# Patient Record
Sex: FEMALE | Race: WHITE | NOT HISPANIC OR LATINO | Employment: OTHER | URBAN - METROPOLITAN AREA
[De-identification: names, ages, dates, MRNs, and addresses within clinical notes are randomized per-mention and may not be internally consistent; named-entity substitution may affect disease eponyms.]

---

## 2020-08-14 ENCOUNTER — TRANSFERRED RECORDS (OUTPATIENT)
Dept: MULTI SPECIALTY CLINIC | Facility: CLINIC | Age: 65
End: 2020-08-14

## 2021-08-01 ASSESSMENT — ENCOUNTER SYMPTOMS
DIARRHEA: 0
NERVOUS/ANXIOUS: 1
HEARTBURN: 0
SHORTNESS OF BREATH: 0
DIZZINESS: 0
CHILLS: 0
FREQUENCY: 0
JOINT SWELLING: 0
SORE THROAT: 0
FEVER: 0
PALPITATIONS: 0
COUGH: 0
HEADACHES: 0
ABDOMINAL PAIN: 0
BREAST MASS: 0
ARTHRALGIAS: 1
NAUSEA: 0
EYE PAIN: 0
MYALGIAS: 1
WEAKNESS: 0
CONSTIPATION: 0
DYSURIA: 0
HEMATURIA: 0
HEMATOCHEZIA: 0
PARESTHESIAS: 0

## 2021-08-01 ASSESSMENT — ACTIVITIES OF DAILY LIVING (ADL): CURRENT_FUNCTION: NO ASSISTANCE NEEDED

## 2021-08-03 ENCOUNTER — OFFICE VISIT (OUTPATIENT)
Dept: FAMILY MEDICINE | Facility: CLINIC | Age: 66
End: 2021-08-03
Payer: MEDICARE

## 2021-08-03 VITALS
SYSTOLIC BLOOD PRESSURE: 136 MMHG | WEIGHT: 201 LBS | TEMPERATURE: 97.9 F | DIASTOLIC BLOOD PRESSURE: 82 MMHG | BODY MASS INDEX: 33.49 KG/M2 | RESPIRATION RATE: 15 BRPM | HEART RATE: 70 BPM | OXYGEN SATURATION: 99 % | HEIGHT: 65 IN

## 2021-08-03 DIAGNOSIS — Z86.16 HISTORY OF COVID-19: ICD-10-CM

## 2021-08-03 DIAGNOSIS — Z00.00 ENCOUNTER FOR MEDICARE ANNUAL WELLNESS EXAM: Primary | ICD-10-CM

## 2021-08-03 DIAGNOSIS — G25.81 RESTLESS LEGS: ICD-10-CM

## 2021-08-03 DIAGNOSIS — E78.49 OTHER HYPERLIPIDEMIA: ICD-10-CM

## 2021-08-03 DIAGNOSIS — Z12.31 VISIT FOR SCREENING MAMMOGRAM: ICD-10-CM

## 2021-08-03 DIAGNOSIS — N95.2 ATROPHIC VAGINITIS: ICD-10-CM

## 2021-08-03 DIAGNOSIS — E66.811 OBESITY (BMI 30.0-34.9): ICD-10-CM

## 2021-08-03 DIAGNOSIS — Z78.0 ASYMPTOMATIC POSTMENOPAUSAL STATUS: ICD-10-CM

## 2021-08-03 DIAGNOSIS — Z87.891 HISTORY OF TOBACCO ABUSE: ICD-10-CM

## 2021-08-03 DIAGNOSIS — Z11.59 ENCOUNTER FOR HEPATITIS C SCREENING TEST FOR LOW RISK PATIENT: ICD-10-CM

## 2021-08-03 DIAGNOSIS — Z87.891 PERSONAL HISTORY OF TOBACCO USE: ICD-10-CM

## 2021-08-03 PROBLEM — J30.9 ALLERGIC RHINITIS: Status: ACTIVE | Noted: 2018-01-08

## 2021-08-03 PROBLEM — M85.80 OSTEOPENIA: Status: ACTIVE | Noted: 2017-01-04

## 2021-08-03 PROBLEM — E78.5 HYPERLIPIDEMIA: Status: ACTIVE | Noted: 2018-01-08

## 2021-08-03 PROBLEM — G47.00 INSOMNIA: Status: ACTIVE | Noted: 2018-01-08

## 2021-08-03 PROBLEM — M19.90 OSTEOARTHRITIS: Status: ACTIVE | Noted: 2021-08-03

## 2021-08-03 LAB
CHOLEST SERPL-MCNC: 158 MG/DL
FASTING STATUS PATIENT QL REPORTED: YES
FASTING STATUS PATIENT QL REPORTED: YES
GLUCOSE BLD-MCNC: 90 MG/DL (ref 70–99)
HDLC SERPL-MCNC: 55 MG/DL
LDLC SERPL CALC-MCNC: 79 MG/DL
NONHDLC SERPL-MCNC: 103 MG/DL
TRIGL SERPL-MCNC: 121 MG/DL

## 2021-08-03 PROCEDURE — 36415 COLL VENOUS BLD VENIPUNCTURE: CPT | Performed by: FAMILY MEDICINE

## 2021-08-03 PROCEDURE — 82947 ASSAY GLUCOSE BLOOD QUANT: CPT | Performed by: FAMILY MEDICINE

## 2021-08-03 PROCEDURE — 80061 LIPID PANEL: CPT | Performed by: FAMILY MEDICINE

## 2021-08-03 PROCEDURE — 86803 HEPATITIS C AB TEST: CPT | Performed by: FAMILY MEDICINE

## 2021-08-03 PROCEDURE — G0438 PPPS, INITIAL VISIT: HCPCS | Performed by: FAMILY MEDICINE

## 2021-08-03 PROCEDURE — G0296 VISIT TO DETERM LDCT ELIG: HCPCS | Performed by: FAMILY MEDICINE

## 2021-08-03 RX ORDER — CONJUGATED ESTROGENS 0.62 MG/G
0.5 CREAM VAGINAL
Qty: 30 G | Refills: 3 | Status: SHIPPED | OUTPATIENT
Start: 2021-08-05 | End: 2022-04-19

## 2021-08-03 RX ORDER — ATORVASTATIN CALCIUM 10 MG/1
10 TABLET, FILM COATED ORAL DAILY
Qty: 90 TABLET | Refills: 3 | Status: SHIPPED | OUTPATIENT
Start: 2021-08-03 | End: 2022-09-23

## 2021-08-03 RX ORDER — CONJUGATED ESTROGENS 0.62 MG/G
CREAM VAGINAL
COMMUNITY
Start: 2021-01-19 | End: 2021-08-03

## 2021-08-03 RX ORDER — ATORVASTATIN CALCIUM 10 MG/1
10 TABLET, FILM COATED ORAL DAILY
Qty: 90 TABLET | Refills: 3 | Status: SHIPPED | OUTPATIENT
Start: 2021-08-03 | End: 2021-08-03

## 2021-08-03 RX ORDER — ALPRAZOLAM 0.5 MG
TABLET ORAL
COMMUNITY
Start: 2020-07-31 | End: 2022-04-19

## 2021-08-03 RX ORDER — ATORVASTATIN CALCIUM 10 MG/1
TABLET, FILM COATED ORAL
COMMUNITY
Start: 2021-07-13 | End: 2021-08-03

## 2021-08-03 ASSESSMENT — ACTIVITIES OF DAILY LIVING (ADL): CURRENT_FUNCTION: NO ASSISTANCE NEEDED

## 2021-08-03 ASSESSMENT — MIFFLIN-ST. JEOR: SCORE: 1447.61

## 2021-08-03 NOTE — PATIENT INSTRUCTIONS
Patient Education   Personalized Prevention Plan  You are due for the preventive services outlined below.  Your care team is available to assist you in scheduling these services.  If you have already completed any of these items, please share that information with your care team to update in your medical record.  Health Maintenance Due   Topic Date Due     Osteoporosis Screening  Never done     ANNUAL REVIEW OF HM ORDERS  Never done     Mammogram  Never done     Colorectal Cancer Screening  Never done     Hepatitis C Screening  Never done     Cholesterol Lab  Never done     FALL RISK ASSESSMENT  Never done       Understanding USDA MyPlate  The USDA has guidelines to help you make healthy food choices. These are called MyPlate. MyPlate shows the food groups that make up healthy meals using the image of a place setting. Before you eat, think about the healthiest choices for what to put on your plate or in your cup or bowl. To learn more about building a healthy plate, visit www.choosemyplate.gov.    The food groups    Fruits. Any fruit or 100% fruit juice counts as part of the Fruit Group. Fruits may be fresh, canned, frozen, or dried, and may be whole, cut-up, or pureed. Make 1/2 of your plate fruits and vegetables.    Vegetables. Any vegetable or 100% vegetable juice counts as a member of the Vegetable Group. Vegetables may be fresh, frozen, canned, or dried. They can be served raw or cooked and may be whole, cut-up, or mashed. Make 1/2 of your plate fruits and vegetables.    Grains. All foods made from grains are part of the Grains Group. These include wheat, rice, oats, cornmeal, and barley. Grains are often used to make foods such as bread, pasta, oatmeal, cereal, tortillas, and grits. Grains should be no more than 1/4 of your plate. At least half of your grains should be whole grains.    Protein. This group includes meat, poultry, seafood, beans and peas, eggs, processed soy products (such as tofu), nuts  (including nut butters), and seeds. Make protein choices no more than 1/4 of your plate. Meat and poultry choices should be lean or low fat.    Dairy. The Dairy Group includes all fluid milk products and foods made from milk that contain calcium, such as yogurt and cheese. (Foods that have little calcium, such as cream, butter, and cream cheese, are not part of this group.) Most dairy choices should be low-fat or fat-free.    Oils. Oils aren't a food group, but they do contain essential nutrients. However it's important to watch your intake of oils. These are fats that are liquid at room temperature. They include canola, corn, olive, soybean, vegetable, and sunflower oil. Foods that are mainly oil include mayonnaise, certain salad dressings, and soft margarines. You likely already get your daily oil allowance from the foods you eat.  Things to limit  Eating healthy also means limiting these things in your diet:       Salt (sodium). Many processed foods have a lot of sodium. To keep sodium intake down, eat fresh vegetables, meats, poultry, and seafood when possible. Purchase low-sodium, reduced-sodium, or no-salt-added food products at the store. And don't add salt to your meals at home. Instead, season them with herbs and spices such as dill, oregano, cumin, and paprika. Or try adding flavor with lemon or lime zest and juice.    Saturated fat. Saturated fats are most often found in animal products such as beef, pork, and chicken. They are often solid at room temperature, such as butter. To reduce your saturated fat intake, choose leaner cuts of meat and poultry. And try healthier cooking methods such as grilling, broiling, roasting, or baking. For a simple lower-fat swap, use plain nonfat yogurt instead of mayonnaise when making potato salad or macaroni salad.    Added sugars. These are sugars added to foods. They are in foods such as ice cream, candy, soda, fruit drinks, sports drinks, energy drinks, cookies,  pastries, jams, and syrups. Cut down on added sugars by sharing sweet treats with a family member or friend. You can also choose fruit for dessert, and drink water or other unsweetened beverages.     [employer] last reviewed this educational content on 6/1/2020 2000-2021 [employer]. All rights reserved. This information is not intended as a substitute for professional medical care. Always follow your healthcare professional's instructions.          Signs of Hearing Loss      Hearing much better with one ear can be a sign of hearing loss.   Hearing loss is a problem shared by many people. In fact, it is one of the most common health problems, particularly as people age. Most people age 65 and older have some hearing loss. By age 80, almost everyone does. Hearing loss often occurs slowly over the years. So you may not realize your hearing has gotten worse.  Have your hearing checked  Call your healthcare provider if you:    Have to strain to hear normal conversation    Have to watch other people s faces very carefully to follow what they re saying    Need to ask people to repeat what they ve said    Often misunderstand what people are saying    Turn the volume of the television or radio up so high that others complain    Feel that people are mumbling when they re talking to you    Find that the effort to hear leaves you feeling tired and irritated    Notice, when using the phone, that you hear better with one ear than the other  [employer] last reviewed this educational content on 1/1/2020 2000-2021 [employer]. All rights reserved. This information is not intended as a substitute for professional medical care. Always follow your healthcare professional's instructions.           Lung Cancer Screening   Frequently Asked Questions  If you are at high-risk for lung cancer, getting screened with low-dose computed tomography (LDCT) every year can help save your life. This handout offers  answers to some of the most common questions about lung cancer screening. If you have other questions, please call [phone] ([phone]).     What is it?  Lung cancer screening uses special X-ray technology to create an image of your lung tissue. The exam is quick and easy and takes less than 10 seconds. We don t give you any medicine or use any needles. You can eat before and after the exam. You don t need to change your clothes as long as the clothing on your chest doesn t contain metal. But, you do need to be able to hold your breath for at least 6 seconds during the exam.    What is the goal of lung cancer screening?  The goal of lung cancer screening is to save lives. Many times, lung cancer is not found until a person starts having physical symptoms. Lung cancer screening can help detect lung cancer in the earliest stages when it may be easier to treat.    Who should be screened for lung cancer?  We suggest lung cancer screening for anyone who is at high-risk for lung cancer. You are in the high-risk group if you:      are between the ages of 55 and 79, and    have smoked at least 1 pack of cigarettes a day for 30 or more years, and    still smoke or have quit within the past 15 years.    However, if you have a new cough or shortness of breath, you should talk to your doctor before being screened.    Some national lung health advocacy groups also recommend screening for people ages 50 to 79 who have smoked an average of 1 pack of cigarettes a day for 20 years. They must also have at least 1 other risk factor for lung cancer, not including exposure to secondhand smoke. Other risk factors are having had cancer in the past, emphysema, pulmonary fibrosis, COPD, a family history of lung cancer, or exposure to certain materials such as arsenic, asbestos, beryllium, cadmium, chromium, diesel fumes, nickel, radon or silica. Your care team can help you know if you have one of these risk factors.     Why  does it matter if I have symptoms?  Certain symptoms can be a sign that you have a condition in your lungs that should be checked and treated by your doctor. These symptoms include fever, chest pain, a new or changing cough, shortness of breath that you have never felt before, coughing up blood or unexplained weight loss. Having any of these symptoms can greatly affect the results of lung cancer screening.       Should all smokers get an LDCT lung cancer screening exam?  It depends. Lung cancer screening is for a very specific group of men and women who have a history of heavy smoking over a long period of time (see  Who should be screened for lung cancer  above).  I am in the high-risk group, but have been diagnosed with cancer in the past. Is LDCT lung cancer screening right for me?  In some cases, you should not have LDCT lung screening, such as when your doctor is already following your cancer with CT scan studies. Your doctor will help you decide if LDCT lung screening is right for you.  Do I need to have a screening exam every year?  Yes. If you are in the high-risk group described earlier, you should get an LDCT lung cancer screening exam every year until you are 79, or are no longer willing or able to undergo screening and possible procedures to diagnose and treat lung cancer.  How effective is LDCT at preventing death from lung cancer?  Studies have shown that LDCT lung cancer screening can lower the risk of death from lung cancer by 20 percent in people who are at high-risk.  What are the risks?  There are some risks and limitations of LDCT lung cancer screening. We want to make sure you understand the risks and benefits, so please let us know if you have any questions. Your doctor may want to talk with you more about these risks.    Radiation exposure: As with any exam that uses radiation, there is a very small increased risk of cancer. The amount of radiation in LDCT is small--about the same amount a  person would get from a mammogram. Your doctor orders the exam when he or she feels the potential benefits outweigh the risks.    False negatives: No test is perfect, including LDCT. It is possible that you may have a medical condition, including lung cancer, that is not found during your exam. This is called a false negative result.    False positives and more testing: LDCT very often finds something in the lung that could be cancer, but in fact is not. This is called a false positive result. False positive tests often cause anxiety. To make sure these findings are not cancer, you may need to have more tests. These tests will be done only if you give us permission. Sometimes patients need a treatment that can have side effects, such as a biopsy. For more information on false positives, see  What can I expect from the results?     Findings not related to lung cancer: Your LDCT exam also takes pictures of areas of your body next to your lungs. In a very small number of cases, the CT scan will show an abnormal finding in one of these areas, such as your kidneys, adrenal glands, liver or thyroid. This finding may not be serious, but you may need more tests. Your doctor can help you decide what other tests you may need, if any.  What can I expect from the results?  About 1 out of 4 LDCT exams will find something that may need more tests. Most of the time, these findings are lung nodules. Lung nodules are very small collections of tissue in the lung. These nodules are very common, and the vast majority--more than 97 percent--are not cancer (benign). Most are normal lymph nodes or small areas of scarring from past infections.  But, if a small lung nodule is found to be cancer, the cancer can be cured more than 90 percent of the time. To know if the nodule is cancer, we may need to get more images before your next yearly screening exam. If the nodule has suspicious features (for example, it is large, has an odd shape or  grows over time), we will refer you to a specialist for further testing.  Will my doctor also get the results?  Yes. Your doctor will get a copy of your results.  Is it okay to keep smoking now that there s a cancer screening exam?  No. Tobacco is one of the strongest cancer-causing agents. It causes not only lung cancer, but other cancers and cardiovascular (heart) diseases as well. The damage caused by smoking builds over time. This means that the longer you smoke, the higher your risk of disease. While it is never too late to quit, the sooner you quit, the better.  Where can I find help to quit smoking?  The best way to prevent lung cancer is to stop smoking. If you have already quit smoking, congratulations and keep it up! For help on quitting smoking, please call [employer] at 5-298-792-WNKR ([phone]) or the [employer] at 1-895.104.9742 to find local resources near you.  One-on-one health coaching:  If you d prefer to work individually with a health care provider on tobacco cessation, we offer:      Medication Therapy Management:  Our specially trained pharmacists work closely with you and your doctor to help you quit smoking.  Call [phone] or [phone] (toll free).     Can Do: Health coaching offered by [facility].  [email or web address]

## 2021-08-03 NOTE — PROGRESS NOTES
"SUBJECTIVE:   Ayah Garcia is a 66 year old female who presents for Preventive Visit.      Patient has been advised of split billing requirements and indicates understanding: Yes   Are you in the first 12 months of your Medicare coverage?  No    Healthy Habits:     In general, how would you rate your overall health?  Good    Frequency of exercise:  2-3 days/week    Duration of exercise:  30-45 minutes    Do you usually eat at least 4 servings of fruit and vegetables a day, include whole grains    & fiber and avoid regularly eating high fat or \"junk\" foods?  No    Taking medications regularly:  Yes    Medication side effects:  Muscle aches    Ability to successfully perform activities of daily living:  No assistance needed    Home Safety:  No safety concerns identified    Hearing Impairment:  Difficulty following a conversation in a noisy restaurant or crowded room    In the past 6 months, have you been bothered by leaking of urine?  No    In general, how would you rate your overall mental or emotional health?  Excellent      PHQ-2 Total Score: 0    Additional concerns today:  Yes    Do you feel safe in your environment? YES    Have you ever done Advance Care Planning? (For example, a Health Directive, POLST, or a discussion with a medical provider or your loved ones about your wishes): Yes, advance care planning is on file.       Fall risk  Fallen 2 or more times in the past year?: No  Any fall with injury in the past year?: No    Cognitive Screening   1) Repeat 3 items (Leader, Season, Table)    2) Clock draw: NORMAL  3) 3 item recall: Recalls 3 objects  Results: 3 items recalled: COGNITIVE IMPAIRMENT LESS LIKELY    Mini-CogTM Copyright ANJANA Briceño. Licensed by the author for use in [facility]; reprinted with permission (juju@.[facility]). All rights reserved.      Do you have sleep apnea, excessive snoring or daytime drowsiness?: no    Reviewed and updated as needed this visit by clinical " staff  Tobacco  Allergies  Meds  Problems  Med Hx  Surg Hx           Reviewed and updated as needed this visit by Provider     Problems  Med Hx  Surg Hx          Social History     Tobacco Use     Smoking status: Former Smoker     Packs/day: 1.50     Years: 45.00     Pack years: 67.50     Quit date: 12/15/2015     Years since quittin.6     Smokeless tobacco: Never Used   Substance Use Topics     Alcohol use: Yes     Comment: occasional         Alcohol Use 2021   Prescreen: >3 drinks/day or >7 drinks/week? No   No flowsheet data found.        Hyperlipidemia Follow-Up      Are you regularly taking any medication or supplement to lower your cholesterol?   Yes- statins    Are you having muscle aches or other side effects that you think could be caused by your cholesterol lowering medication?  No      Current providers sharing in care for this patient include:   Patient Care Team:  No Ref-Primary, Physician as PCP - General    The following health maintenance items are reviewed in Epic and correct as of today:  Health Maintenance Due   Topic Date Due     DEXA  Never done     ANNUAL REVIEW OF  ORDERS  Never done     MAMMO SCREENING  Never done     COLORECTAL CANCER SCREENING  Never done     HEPATITIS C SCREENING  Never done     LIPID  Never done     FALL RISK ASSESSMENT  Never done     Lab work is in process  Labs reviewed in EPIC  BP Readings from Last 3 Encounters:   21 136/82    Wt Readings from Last 3 Encounters:   21 91.2 kg (201 lb)                  Patient Active Problem List   Diagnosis     Allergic rhinitis     Atrophic vaginitis     History of tobacco use     Hyperlipidemia     Insomnia     Obesity (BMI 30.0-34.9)     Osteoarthritis     Osteopenia     Restless legs     Rosacea     History of COVID-19     Past Surgical History:   Procedure Laterality Date     APPENDECTOMY OPEN  1971     bladder lift           C TOTAL ABDOM HYSTERECTOMY  10/30/1986     OVARIAN CYST REMOVAL  N/A        Social History     Tobacco Use     Smoking status: Former Smoker     Packs/day: 1.50     Years: 45.00     Pack years: 67.50     Quit date: 12/15/2015     Years since quittin.6     Smokeless tobacco: Never Used   Substance Use Topics     Alcohol use: Yes     Comment: occasional     Family History   Problem Relation Age of Onset     Melanoma Father         d late 60     Ovarian Cancer Mother         d age 45     Pulmonary fibrosis Sister      Coronary Artery Disease Maternal Grandmother      Breast Cancer Other         maternal aunt     Suicide Paternal Grandfather          Current Outpatient Medications   Medication Sig Dispense Refill     ALPRAZolam (XANAX) 0.5 MG tablet        atorvastatin (LIPITOR) 10 MG tablet Take 1 tablet (10 mg) by mouth daily 90 tablet 3     [START ON 2021] PREMARIN 0.625 MG/GM vaginal cream Place 0.5 g vaginally twice a week 30 g 3     Allergies   Allergen Reactions     Codeine Nausea and Nausea and Vomiting     Mammogram Screening: advised annual mammogram    FHS-7:   Breast CA Risk Assessment (FHS-7) 2021   Did any of your first-degree relatives have breast or ovarian cancer? Yes   Did any of your relatives have bilateral breast cancer? No   Did any man in your family have breast cancer? No   Did any woman in your family have breast and ovarian cancer? No   Did any woman in your family have breast cancer before age 50 y? No   Do you have 2 or more relatives with breast and/or ovarian cancer? No   Do you have 2 or more relatives with breast and/or bowel cancer? No       advised annual mammogram  Pertinent mammograms are reviewed under the imaging tab.    Review of Systems  CONSTITUTIONAL: NEGATIVE for fever, chills, change in weight  INTEGUMENTARY/SKIN: NEGATIVE for worrisome rashes, moles or lesions  EYES: NEGATIVE for vision changes or irritation  ENT/MOUTH: NEGATIVE for ear, mouth and throat problems  RESP: NEGATIVE for significant cough or SOB  BREAST:  "NEGATIVE for masses, tenderness or discharge  CV: NEGATIVE for chest pain, palpitations or peripheral edema  GI: NEGATIVE for nausea, abdominal pain, heartburn, or change in bowel habits  : NEGATIVE for frequency, dysuria, or hematuria  MUSCULOSKELETAL: NEGATIVE for significant arthralgias or myalgia  NEURO: NEGATIVE for weakness, dizziness or paresthesias  ENDOCRINE: NEGATIVE for temperature intolerance, skin/hair changes  HEME: NEGATIVE for bleeding problems  PSYCHIATRIC: NEGATIVE for changes in mood or affect    OBJECTIVE:   /82   Pulse 70   Temp 97.9  F (36.6  C)   Resp 15   Ht 1.651 m (5' 5\")   Wt 91.2 kg (201 lb)   SpO2 99%   BMI 33.45 kg/m   Estimated body mass index is 33.45 kg/m  as calculated from the following:    Height as of this encounter: 1.651 m (5' 5\").    Weight as of this encounter: 91.2 kg (201 lb).  Physical Exam  GENERAL APPEARANCE: healthy, alert and no distress  EYES: Eyes grossly normal to inspection, PERRL and conjunctivae and sclerae normal  HENT: ear canals and TM's normal, nose and mouth without ulcers or lesions, oropharynx clear and oral mucous membranes moist  NECK: no adenopathy, no asymmetry, masses, or scars and thyroid normal to palpation  RESP: lungs clear to auscultation - no rales, rhonchi or wheezes  BREAST: normal without masses, tenderness or nipple discharge and no palpable axillary masses or adenopathy  CV: regular rate and rhythm, normal S1 S2, no S3 or S4, no murmur, click or rub, no peripheral edema and peripheral pulses strong  ABDOMEN: soft, nontender, no hepatosplenomegaly, no masses and bowel sounds normal  MS: no musculoskeletal defects are noted and gait is age appropriate without ataxia  SKIN: no suspicious lesions or rashes  NEURO: Normal strength and tone, sensory exam grossly normal, mentation intact and speech normal  PSYCH: mentation appears normal and affect normal/bright    Diagnostic Test Results:  Labs reviewed in Epic  Pending "     ASSESSMENT / PLAN:   1. Encounter for Medicare annual wellness exam    - Glucose; Future    2. History of tobacco abuse  Pt needs annual screen-next after 9-3-21  - CT Chest Lung Cancer Scrn Low Dose wo; Future    3. Visit for screening mammogram  Advised annually  - MA Screening Digital Bilateral; Future    4. Atrophic vaginitis  refilled  - PREMARIN 0.625 MG/GM vaginal cream; Place 0.5 g vaginally twice a week  Dispense: 30 g; Refill: 3    5. Other hyperlipidemia  Pending   - Lipid panel reflex to direct LDL Non-fasting; Future  - atorvastatin (LIPITOR) 10 MG tablet; Take 1 tablet (10 mg) by mouth daily  Dispense: 90 tablet; Refill: 3    6. Encounter for hepatitis C screening test for low risk patient  Advised   - Hepatitis C Screen Reflex to HCV RNA Quant and Genotype; Future    7. Asymptomatic postmenopausal status  Advised   Your bone mass is mildly low, called osteopenia. . We can continue to follow this by repeating the DEXA test in 3 to  4 years. Please take several servings of dairy daily (or calcium 1000 - 1200 mg daily  eg Caltrate , take vitamin D 1000 units daily over-the-counter, exercise regularly, and avoid falls.  We can continue to follow this by repeating the DEXA test in 3 to  4 years.      - DX Hip/Pelvis/Spine; Future    8. History of COVID-19  Last year  Was mild still has some Loss of smell    9. Personal history of tobacco use  Quit 2015  - Prof Fee: Shared Decision Making Visit for Lung Cancer Screening  - CT Chest Lung Cancer Scrn Low Dose wo; Future    10. Restless legs  Takes xanax  Consider alternatives    11. Obesity (BMI 30.0-34.9)  Low hansa      Patient has been advised of split billing requirements and indicates understanding: handouts  COUNSELING:  Reviewed preventive health counseling, as reflected in patient instructions       Regular exercise       Healthy diet/nutrition       Vision screening       Hearing screening       Dental care       Bladder control       Fall risk  "prevention       Osteoporosis prevention/bone health       Consider lung cancer screening for ages 55-80 years and 30 pack-year smoking history        Colon cancer screening       Hepatitis C screening       The ASCVD Risk score (Asim JIGAR Jr., et al., 2013) failed to calculate for the following reasons:    Cannot find a previous HDL lab    Cannot find a previous total cholesterol lab       Advanced Planning     Estimated body mass index is 33.45 kg/m  as calculated from the following:    Height as of this encounter: 1.651 m (5' 5\").    Weight as of this encounter: 91.2 kg (201 lb).    Weight management plan: Discussed healthy diet and exercise guidelines    She reports that she quit smoking about 5 years ago. She has a 67.50 pack-year smoking history. She has never used smokeless tobacco.      Appropriate preventive services were discussed with this patient, including applicable screening as appropriate for cardiovascular disease, diabetes, osteopenia/osteoporosis, and glaucoma.  As appropriate for age/gender, discussed screening for colorectal cancer, prostate cancer, breast cancer, and cervical cancer. Checklist reviewing preventive services available has been given to the patient.    Reviewed patients plan of care and provided an AVS. The Intermediate Care Plan ( asthma action plan, low back pain action plan, and migraine action plan) for Ayah meets the Care Plan requirement. This Care Plan has been established and reviewed with the Patient.    Counseling Resources:  ATP IV Guidelines  Pooled Cohorts Equation Calculator  Breast Cancer Risk Calculator  Breast Cancer: Medication to Reduce Risk  FRAX Risk Assessment  ICSI Preventive Guidelines  Dietary Guidelines for Americans, 2010  [employer]'s MyPlate  ASA Prophylaxis  Lung CA Screening    Martha Rosa MD  [facility]    Identified Health Risks:  "

## 2021-08-03 NOTE — PROGRESS NOTES
"    The patient was counseled and encouraged to consider modifying their diet and eating habits. She was provided with information on recommended healthy diet options.  The patient was provided with written information regarding signs of hearing loss.  Answers for HPI/ROS submitted by the patient on 8/1/2021  In general, how would you rate your overall physical health?: good  Frequency of exercise:: 2-3 days/week  Do you usually eat at least 4 servings of fruit and vegetables a day, include whole grains & fiber, and avoid regularly eating high fat or \"junk\" foods? : No  Taking medications regularly:: Yes  Medication side effects:: Muscle aches  Activities of Daily Living: no assistance needed  Home safety: no safety concerns identified  Hearing Impairment:: difficulty following a conversation in a noisy restaurant or crowded room  In the past 6 months, have you been bothered by leaking of urine?: No  abdominal pain: No  Blood in stool: No  Blood in urine: No  chest pain: No  chills: No  congestion: No  constipation: No  cough: No  diarrhea: No  dizziness: No  ear pain: No  eye pain: No  nervous/anxious: Yes  fever: No  frequency: No  genital sores: No  headaches: No  hearing loss: No  heartburn: No  arthralgias: Yes  joint swelling: No  peripheral edema: No  mood changes: No  myalgias: Yes  nausea: No  dysuria: No  palpitations: No  Skin sensation changes: No  sore throat: No  urgency: No  rash: No  shortness of breath: No  visual disturbance: No  weakness: No  pelvic pain: No  vaginal bleeding: No  vaginal discharge: No  tenderness: No  breast mass: No  breast discharge: No  In general, how would you rate your overall mental or emotional health?: excellent  Additional concerns today:: Yes  Duration of exercise:: 30-45 minutes      Lung Cancer Screening Shared Decision Making Visit     Ayah Garcia is eligible for lung cancer screening on the basis of the information provided in my signed lung cancer " screening order.     I have discussed with patient the risks and benefits of screening for lung cancer with low-dose CT.     The risks include:  radiation exposure: one low dose chest CT has as much ionizing radiation as about 15 chest x-rays or 6 months of background radiation living in [place]    false positives: 96% of positive findings/nodules are NOT cancer, but some might still require additional diagnostic evaluation, including biopsy  over-diagnosis: some slow growing cancers that might never have been clinically significant will be detected and treated unnecessarily     The benefit of early detection of lung cancer is contingent upon adherence to annual screening or more frequent follow up if indicated.     Furthermore, reaping the benefits of screening requires Ayah Riversin to be willing and physically able to undergo diagnostic procedures, if indicated. Although no specific guide is available for determining severity of comorbidities, it is reasonable to withhold screening in patients who have greater mortality risk from other diseases.     We did discuss that the only way to prevent lung cancer is to not smoke. Smoking cessation counseling was not given.

## 2021-08-04 LAB — HCV AB SERPL QL IA: NONREACTIVE

## 2021-08-18 ENCOUNTER — ANCILLARY PROCEDURE (OUTPATIENT)
Dept: MAMMOGRAPHY | Facility: CLINIC | Age: 66
End: 2021-08-18
Attending: FAMILY MEDICINE
Payer: MEDICARE

## 2021-08-18 DIAGNOSIS — Z12.31 VISIT FOR SCREENING MAMMOGRAM: ICD-10-CM

## 2021-08-18 PROCEDURE — 77067 SCR MAMMO BI INCL CAD: CPT | Mod: TC | Performed by: RADIOLOGY

## 2021-08-18 PROCEDURE — 77063 BREAST TOMOSYNTHESIS BI: CPT | Mod: TC | Performed by: RADIOLOGY

## 2021-10-12 ENCOUNTER — ANCILLARY PROCEDURE (OUTPATIENT)
Dept: CT IMAGING | Facility: CLINIC | Age: 66
End: 2021-10-12
Attending: FAMILY MEDICINE
Payer: MEDICARE

## 2021-10-12 DIAGNOSIS — Z87.891 PERSONAL HISTORY OF TOBACCO USE: ICD-10-CM

## 2021-10-12 PROCEDURE — G1004 CDSM NDSC: HCPCS | Mod: TC | Performed by: RADIOLOGY

## 2021-10-12 PROCEDURE — 71271 CT THORAX LUNG CANCER SCR C-: CPT | Mod: TC | Performed by: RADIOLOGY

## 2021-10-14 ENCOUNTER — TELEPHONE (OUTPATIENT)
Dept: FAMILY MEDICINE | Facility: CLINIC | Age: 66
End: 2021-10-14
Payer: MEDICARE

## 2021-10-14 NOTE — TELEPHONE ENCOUNTER
Imaging calling with incidental finding. CT chest 10/12/21    Coranary artery calcium moderate to severe    Routing to ordering provider.     Radha Mckeon RN

## 2021-10-26 ENCOUNTER — VIRTUAL VISIT (OUTPATIENT)
Dept: FAMILY MEDICINE | Facility: CLINIC | Age: 66
End: 2021-10-26
Payer: MEDICARE

## 2021-10-26 DIAGNOSIS — R73.02 IMPAIRED GLUCOSE TOLERANCE: ICD-10-CM

## 2021-10-26 DIAGNOSIS — E66.811 OBESITY (BMI 30.0-34.9): ICD-10-CM

## 2021-10-26 DIAGNOSIS — E78.5 HYPERLIPIDEMIA, UNSPECIFIED HYPERLIPIDEMIA TYPE: ICD-10-CM

## 2021-10-26 DIAGNOSIS — R93.1 ELEVATED CORONARY ARTERY CALCIUM SCORE: Primary | ICD-10-CM

## 2021-10-26 PROCEDURE — 99213 OFFICE O/P EST LOW 20 MIN: CPT | Mod: 95 | Performed by: FAMILY MEDICINE

## 2021-10-26 RX ORDER — CHOLECALCIFEROL (VITAMIN D3) 50 MCG
1 TABLET ORAL DAILY
COMMUNITY

## 2021-10-26 NOTE — PROGRESS NOTES
Alysha is a 66 year old who is being evaluated via a billable video visit.      How would you like to obtain your AVS? MyChart  Will anyone else be joining your video visit? No    Video Start Time: 11.25 AM    Assessment & Plan     Elevated coronary artery calcium score  Discussed to control risk factors for ASCVD  Discussed Risk factors    Hyperlipidemia, unspecified hyperlipidemia type  Pt on High intensity statin  She will Try to alternate  20 mg with 10 mg lipitor  Call if side effects    Impaired glucose tolerance  Advised decreasing carbs  consider seeing Prediabetic ed    Obesity (BMI 30.0-34.9)  Discussed Mediterranean diet  Discussed needs to Lose weight  Discussed she can get a Stress test  Pt is asymptomatic  She would Like to watch risk factors  Does not want stress test    No follow-ups on file.    Martha Rosa MD  [facility] [place]    Subjective   Alysha is a 66 year old who presents for the following health issues     HPI   Pt here to discuss elevated calcium score on CT scan  No chest pain  No sob  She feels well  Her cardiac risk factors are   Hyperlipidemia on High intensity Statin  Obesity  Prediabetc  Family history  CAD    Review of Systems   CONSTITUTIONAL: NEGATIVE for fever, chills, change in weight  ENT/MOUTH: NEGATIVE for ear, mouth and throat problems  RESP: NEGATIVE for significant cough or SOB  CV: NEGATIVE for chest pain, palpitations or peripheral edema  ROS otherwise negative      Objective           Vitals:  No vitals were obtained today due to virtual visit.    Physical Exam   GENERAL: Healthy, alert and no distress  EYES: Eyes grossly normal to inspection.  No discharge or erythema, or obvious scleral/conjunctival abnormalities.  RESP: No audible wheeze, cough, or visible cyanosis.  No visible retractions or increased work of breathing.    SKIN: Visible skin clear. No significant rash, abnormal pigmentation or lesions.  NEURO: Cranial nerves grossly intact.   Mentation and speech appropriate for age.  PSYCH: Mentation appears normal, affect normal/bright, judgement and insight intact, normal speech and appearance well-groomed.    Office Visit on 08/03/2021   Component Date Value Ref Range Status     Hepatitis C Antibody 08/03/2021 Nonreactive  Nonreactive Final     Glucose 08/03/2021 90  70 - 99 mg/dL Final     Patient Fasting > 8hrs? 08/03/2021 Yes   Final     Cholesterol 08/03/2021 158  <200 mg/dL Final    Age 0-19 years  Desirable: <170 mg/dL  Borderline high:  170-199 mg/dl  High:            >199 mg/dl    Age 20 years and older  Desirable: <200 mg/dL     Triglycerides 08/03/2021 121  <150 mg/dL Final    0-9 years:  Normal:    Less than 75 mg/dL  Borderline high:  75-99 mg/dL  High:             Greater than or equal to 100 mg/dL    0-19 years:  Normal:    Less than 90 mg/dL  Borderline high:   mg/dL  High:             Greater than or equal to 130 mg/dL    20 years and older:  Normal:    Less than 150 mg/dL  Borderline high:  150-199 mg/dL  High:             200-499 mg/dL  Very high:   Greater than or equal to 500 mg/dL     Direct Measure HDL 08/03/2021 55  >=50 mg/dL Final    0-19 years:       Greater than or equal to 45 mg/dL   Low: Less than 40 mg/dL   Borderline low: 40-44 mg/dL     20 years and older:   Female: Greater than or equal to 50 mg/dL   Male:   Greater than or equal to 40 mg/dL          LDL Cholesterol Calculated 08/03/2021 79  <=100 mg/dL Final    Age 0-19 years:  Desirable: 0-110 mg/dL   Borderline high: 110-129 mg/dL   High: >= 130 mg/dL    Age 20 years and older:  Desirable: <100mg/dL  Above desirable: 100-129 mg/dL   Borderline high: 130-159 mg/dL   High: 160-189 mg/dL   Very high: >= 190 mg/dL     Non HDL Cholesterol 08/03/2021 103  <130 mg/dL Final    0-19 years:  Desirable:          Less than 120 mg/dL  Borderline high:   120-144 mg/dL  High:                   Greater than or equal to 145 mg/dL    20 years and older:  Desirable:           130 mg/dL  Above Desirable: 130-159 mg/dL  Borderline high:   160-189 mg/dL  High:               190-219 mg/dL  Very high:     Greater than or equal to 220 mg/dL     Patient Fasting > 8hrs? 08/03/2021 Yes   Final     Video-Visit Details    Type of service:  Video Visit    Video End Time:11:35 AM    Originating Location (pt. Location): Home    Distant Location (provider location):  [facility]     Platform used for Video Visit: Jakub   11:41 AM -visit complete

## 2021-10-26 NOTE — PATIENT INSTRUCTIONS
Patient Education     Mediterranean Diet  A heart healthy eating plan  The Mediterranean Diet is based on the eating habits of people in countries near the [place]. People living in this part of the world have long lives and low rates of chronic diseases. They have lower rates of death from heart disease, cancer and other illnesses.  When you follow this eating plan you'll have better control of your blood sugar and weight. The plan requires simple changes in your habits of eating, and the whole family can take part.  Mediterranean lifestyle   Enjoy eating with others. Sit at the table with family and friends and enjoy your meal. When you eat slowly, you are able to tune in to your body's hunger and fullness signals. You're less likely to overeat.  Be physically active: Being active every day is important for overall good health. Run, walk, dance and do lighter activities such as house and yard work. Move more and sit less!  Drink plenty of water during the day.  Drink red wine with meals in modest amounts (optional).  The Mediterranean Pyramid   The pyramid shows the food groups and the amounts eaten in relation to the whole diet. It is more than a diet; it is also a life-style plan.  The largest food group at bottom contains plant foods: vegetables, fruits, grains, nuts, legumes, seeds, olives and olive oil. These foods make up the largest part of your meals.   The next groups above: fish and seafood, then poultry, cheese, eggs and yogurt are eaten less often and in smaller servings.   The top group, meats and sweets, are eaten the least often and in the smallest amounts.    Tips for adding plant foods to your meals   Vegetables and fruits:     Aim for 3 to 8 servings each day. A serving is   to 2 cups, depending on the food.    Choose a variety of colors. Big green salads are a great way to include several vegetable servings.    Try fresh fruit as a dessert: oranges, grapes, apples pomegranates or  "fresh figs.    At home keep fresh fruit in a bowl to tempt family.    Bring fruit and vegetables to work for a snack.  Oil     Replace butter and margarine with healthy fats such as olive oil. Other plant-based oils are canola, walnut and peanut oil. These are high in good fats. Use them in cooking, salad dressings and baking.    Drizzle your bread with olive oil instead of butter or margarine. Use herbs and spices to add flavor and aroma and to reduce fat and salt when cooking.  Whole grains    Look for the term \"whole\" or \"whole grain\" on the package. Processing grains removes vitamins, minerals and fiber. Whole grains may include: corn, wheat, oats, rye, rice and barley.    Examples of Mediterranean grains include: barley, farro, buckwheat, bulgur, couscous, and wheatberries.    Slowly switch to a whole grain by using whole-grain blends of pastas and rice. Or mix whole grains with refined; for example, mix whole-wheat pasta with white pasta.  Beans and legumes     Beans are a good source of protein and fiber, adding flavor and texture to dishes. Examples include cannellini beans, chickpea, cale beans, green beans, kidney beans, lentils and split peas.    Cook a vegetarian meal one night a week: use beans or legumes with vegetables and grains.    Nuts are high in healthy fats. Try walnuts, almonds, pine nuts, hazelnuts and cashews. Avoid candied, honey-roasted and heavily salted nuts.    Limit your intake of nuts to a small handful each day. Explore ways to add to nuts to salads and other dishes.  Tips for using fish and seafood in your meals    Aim for meals with fish or shellfish at least twice a week.    Tuna, herring, salmon and sardines are rich in heart-healthy omega-3. Shellfish, such as mussels, oysters and clams, have similar benefits for brain and heart health.  Tips for using poultry, eggs and cheese and yogurt in your meals    Eat poultry or eggs at least twice a week.    Roast, broil or grill your " poultry. Season with fresh or dried herbs.    Enjoy low-fat cheese or yogurt every day.  Tips for using red meat and sweets in your meals     Limit lean red meat to one time a week or 4 times a month.    Red meat has more saturated fats. Choose a lean cut, like top loin, sirloin, flank steak, strip steak or 90% lean ground beef.    Limit portions to 3 to 4 ounces.    Make whole grains and vegetables the main focus of a meal. Add meat in small amounts for flavor.    Limit sweets such as ice cream or cookies for a special times or holidays.  Snacks    Snack on a handful of almonds, walnuts or sunflower seeds in place of chips, cookies or other processed snack foods.    Calcium-rich, low-fat cheese or low- and nonfat plain yogurt with fresh fruit are healthy snacks that are easy to take with you.  Like to know more?  For tips on shoppping, cooking and eating well the Mediterranean way, go to the [employer] website at www.[employer]imag[employer].  For informational purposes only. Not to replace the advice of your health care provider.   Copyright   2014 [employer] [facility]. All rights reserved.   Mediterranean pyramrid used with permission of the [employer]. [place] [id number] - 09/15.           Patient Education     Understanding the Mediterranean Diet  A Mediterranean-style diet is a healthy way of eating, not a specific diet to lose weight. It includes a lot of foods from plants such as vegetables, fruits, and whole grains, plus olive oil and seafood. It also includes dairy foods and meats, but in smaller amounts. And it includes a moderate amount of wine. Many studies over time have shown health benefits to eating this way. It focuses on making fresh food that s full of flavor.  This plan of eating is inspired by how people eat in countries around the [place]. They include [place], [place], [place], [place], [place], and [place]. But it uses foods you can buy in almost any grocery store.  Health benefits of a  Mediterranean diet  This diet is high in fiber, lean protein, and healthy oils. It s low in saturated fats and sugar. The diet has been shown to help prevent or manage:    Depression    Diabetes    Heart disease    High blood pressure    Parkinson disease    Alzheimer disease    Cancers of the colon, prostate, and breast  What do I eat on a Mediterranean diet?  Plan each meal around vegetables and whole grains. Use olive oil. Add nuts and legumes. Include fish or lean protein. Foods to focus your meals around include:  Food type What to eat   Vegetables This includes leafy greens, tomatoes, squash, peppers, cucumbers, green beans, eggplant, avocados, potatoes, and olives. You can use fresh or frozen vegetables.   Fruits This includes apples, raspberries, strawberries, grapes, citrus fruit such as oranges and grapefruit, stone fruit such as apricots and peaches, plus figs, dates, and melon.   Whole grains This includes brown rice, whole oats, quinoa, millet, whole grain bread, whole-wheat pasta, and crackers made with whole grains.   Beans and legumes These include lentils, chickpeas, and beans such as maria, cale, kidney, and black beans. Peanuts are also legumes.   Seafood This includes fish such as salmon, trout, mackerel, mario, tuna, sardines, anchovies, and whitefish. It also includes shellfish such as shrimp, oysters, mussels, and clams.   Nuts and seeds These include walnuts, almonds, sunflower seeds, cashews, brazil nuts, and pecans.   Healthy oil Olive oil is the most common oil in the Mediterranean diet. But other healthy oils are canola, sunflower, safflower, and corn oils.   Herbs and spices Season food with oregano, pepper, gigi, tarragon, thyme, basil, cinnamon, and cumin.   Wine Enjoy a glass of wine each day with a meal. Skip this if you need to not have alcohol for any reason.   Foods to eat in smaller amounts  You can add these foods in a few days a week, in smaller amounts:    Dairy foods, such  as cheese, yogurt, and butter    Poultry, such as chicken and duck    Eggs  Occasional treats  Limit these foods in your weekly eating plan:    Red meats, such as beef, lamb, and pork    Refined grains, such as white rice and foods made with white flour    Sugary treats, such as chocolate, candy, or pastries  Adding lots of flavor  You can liven up fresh foods with many kinds of flavor. Try these sauces, dips, and seasonings:    Sarikamus    Marinara sauce    Salsa    Vinaigrette dressing  Tips for eating out  At restaurants:    Skip fried foods. These have a lot of saturated fat.    Look for fish dishes that are cooked without cream or butter.    Pick salads that have nuts and seeds.    Choose vegetarian options that don t have too much cheese.  Ela last reviewed this educational content on 5/1/2020 2000-2021 [employer]. All rights reserved. This information is not intended as a substitute for professional medical care. Always follow your healthcare professional's instructions.

## 2021-10-30 ENCOUNTER — MYC MEDICAL ADVICE (OUTPATIENT)
Dept: FAMILY MEDICINE | Facility: CLINIC | Age: 66
End: 2021-10-30

## 2021-11-03 ENCOUNTER — ANCILLARY PROCEDURE (OUTPATIENT)
Dept: BONE DENSITY | Facility: CLINIC | Age: 66
End: 2021-11-03
Attending: FAMILY MEDICINE
Payer: MEDICARE

## 2021-11-03 DIAGNOSIS — Z78.0 ASYMPTOMATIC POSTMENOPAUSAL STATUS: ICD-10-CM

## 2021-11-03 PROCEDURE — 77080 DXA BONE DENSITY AXIAL: CPT | Performed by: INTERNAL MEDICINE

## 2021-11-15 ENCOUNTER — OFFICE VISIT (OUTPATIENT)
Dept: FAMILY MEDICINE | Facility: CLINIC | Age: 66
End: 2021-11-15
Payer: MEDICARE

## 2021-11-15 VITALS
DIASTOLIC BLOOD PRESSURE: 82 MMHG | WEIGHT: 210 LBS | TEMPERATURE: 99.3 F | OXYGEN SATURATION: 96 % | HEART RATE: 86 BPM | HEIGHT: 65 IN | BODY MASS INDEX: 34.99 KG/M2 | SYSTOLIC BLOOD PRESSURE: 133 MMHG | RESPIRATION RATE: 18 BRPM

## 2021-11-15 DIAGNOSIS — N95.0 POSTMENOPAUSAL BLEEDING: Primary | ICD-10-CM

## 2021-11-15 LAB
ALBUMIN UR-MCNC: NEGATIVE MG/DL
APPEARANCE UR: CLEAR
BILIRUB UR QL STRIP: NEGATIVE
COLOR UR AUTO: YELLOW
GLUCOSE UR STRIP-MCNC: NEGATIVE MG/DL
HGB UR QL STRIP: ABNORMAL
KETONES UR STRIP-MCNC: NEGATIVE MG/DL
LEUKOCYTE ESTERASE UR QL STRIP: NEGATIVE
NITRATE UR QL: NEGATIVE
PH UR STRIP: 5.5 [PH] (ref 5–7)
RBC #/AREA URNS AUTO: NORMAL /HPF
SP GR UR STRIP: >=1.03 (ref 1–1.03)
UROBILINOGEN UR STRIP-ACNC: 0.2 E.U./DL
WBC #/AREA URNS AUTO: NORMAL /HPF

## 2021-11-15 PROCEDURE — 99213 OFFICE O/P EST LOW 20 MIN: CPT | Performed by: FAMILY MEDICINE

## 2021-11-15 PROCEDURE — 81001 URINALYSIS AUTO W/SCOPE: CPT | Performed by: FAMILY MEDICINE

## 2021-11-15 ASSESSMENT — MIFFLIN-ST. JEOR: SCORE: 1488.43

## 2021-11-15 NOTE — PROGRESS NOTES
"  Assessment & Plan     Postmenopausal bleeding  I do not see anything on exam  - UA Macro with Reflex to Micro and Culture - lab collect; Future  - Ob/Gyn Referral  Return in about 1 month (around 12/15/2021) for Specialist appointment as discussed.    Martha Rosa MD  [facility] KEIKO Encarnacion is a 66 year old who presents for the following health issues   Postmenopausal Bleeding  Had 4 Blood spots on underwear-1 month ago  No further Bleeong   No abdominal or pelvic pain  Pt had Hysterectomy 1986  Due to ovarian cyst  -Total Hysterectomy per PT  Says bleeding was From Vagina  No known Trauma  No blood in Urine  Family history Ovarian and Breast CA     HPI Review of Systems   Rest of the ROS is Negative except see above and Problem list [stable]        Objective    /82   Pulse 86   Temp 99.3  F (37.4  C) (Oral)   Resp 18   Ht 1.651 m (5' 5\")   Wt 95.3 kg (210 lb)   SpO2 96%   BMI 34.95 kg/m    Body mass index is 34.95 kg/m .  Physical Exam   GENERAL: healthy, alert, no distress and obese  NECK: no adenopathy, no asymmetry, masses, or scars and thyroid normal to palpation  ABDOMEN: soft, nontender, no hepatosplenomegaly, no masses and bowel sounds normal   (female): normal female external genitalia and normal post-hysterectomy exam without masses.     Pending urine check      "

## 2022-04-19 ENCOUNTER — OFFICE VISIT (OUTPATIENT)
Dept: OBGYN | Facility: CLINIC | Age: 67
End: 2022-04-19
Attending: FAMILY MEDICINE
Payer: MEDICARE

## 2022-04-19 VITALS
OXYGEN SATURATION: 97 % | SYSTOLIC BLOOD PRESSURE: 146 MMHG | BODY MASS INDEX: 35.31 KG/M2 | HEART RATE: 65 BPM | DIASTOLIC BLOOD PRESSURE: 85 MMHG | WEIGHT: 212.2 LBS

## 2022-04-19 DIAGNOSIS — N95.2 ATROPHY OF VAGINA: Primary | ICD-10-CM

## 2022-04-19 PROCEDURE — 99203 OFFICE O/P NEW LOW 30 MIN: CPT | Performed by: OBSTETRICS & GYNECOLOGY

## 2022-04-19 RX ORDER — ESTRADIOL 10 UG/1
10 INSERT VAGINAL
Qty: 12 TABLET | Refills: 4 | Status: SHIPPED | OUTPATIENT
Start: 2022-04-21 | End: 2022-10-04

## 2022-04-19 NOTE — PROGRESS NOTES
Ayah is a 67 year old  referred here by Dr. Rosa with complaint of abnormal vaginal bleeding.    The first time she had this, she had pink blood on the tissue.  A couple of months later, she had 4 drops of red blood on under garments.  She knows it is from the vagina as she used the toilet paper to check location.   In February she had additional pink discharge, like pink lemonade.    She has had no aggravating or alleviating factors.    Dr. Rosa did an exam and did not find a reason for the bleeding.    She had been on Premarin vaginal cream but she stopped it a couple of years ago.       Past Medical History:   Diagnosis Date     Former smoker      Restless leg syndrome        Past Surgical History:   Procedure Laterality Date     APPENDECTOMY OPEN  1971     bladder lift           OVARIAN CYST REMOVAL N/A      [facility] TOTAL ABDOM HYSTERECTOMY  10/30/1986    ovarian cysts, endometriosis       OB History    Para Term  AB Living   1 0 0 0 0 0   SAB IAB Ectopic Multiple Live Births   0 0 0 0 1      # Outcome Date GA Lbr Roger/2nd Weight Sex Delivery Anes PTL Lv   1                 Gynecological History         No LMP recorded. Patient has had a hysterectomy.     No STD/No PID/No IUD      see above HPI         Allergies   Allergen Reactions     Codeine Nausea and Nausea and Vomiting       Current Outpatient Medications   Medication Sig Dispense Refill     atorvastatin (LIPITOR) 10 MG tablet Take 1 tablet (10 mg) by mouth daily 90 tablet 3     Multiple Vitamins-Minerals (MULTI COMPLETE PO) Take 1 capsule by mouth daily       vitamin D3 (CHOLECALCIFEROL) 50 mcg (2000 units) tablet Take 1 tablet by mouth daily         Social History     Socioeconomic History     Marital status:      Spouse name: Not on file     Number of children: 1     Years of education: Not on file     Highest education level: Not on file   Occupational History     Occupation: Retired   Tobacco Use      Smoking status: Former Smoker     Packs/day: 1.50     Years: 45.00     Pack years: 67.50     Quit date: 12/15/2015     Years since quittin.3     Smokeless tobacco: Never Used   Substance and Sexual Activity     Alcohol use: Yes     Comment: occasional     Drug use: Never     Sexual activity: Not on file   Other Topics Concern     Not on file   Social History Narrative     Not on file     Social Determinants of Health     Financial Resource Strain: Not on file   Food Insecurity: Not on file   Transportation Needs: Not on file   Physical Activity: Not on file   Stress: Not on file   Social Connections: Not on file   Intimate Partner Violence: Not on file   Housing Stability: Not on file       Family History   Problem Relation Age of Onset     Ovarian Cancer Mother         d age 45     Coronary Artery Disease Father         MI age 40     Melanoma Father         d late 60     Pulmonary fibrosis Sister      Coronary Artery Disease Maternal Grandmother      Suicide Paternal Grandfather      Breast Cancer Other         maternal aunt         Review of Systems:  10 point ROS of systems including Constitutional, Eyes, Respiratory, Cardiovascular, Gastroenterology, Genitourinary, Integumentary, Muscularskeletal, Psychiatric were all negative except for pertinent positives noted in my HPI and in the PMH.          EXAM:  BP (!) 146/85 (BP Location: Right arm, Cuff Size: Adult Large)   Pulse 65   Wt 96.3 kg (212 lb 3.2 oz)   SpO2 97%   BMI 35.31 kg/m    Body mass index is 35.31 kg/m .  General Appearance:  healthy, alert, active, no distress  Skin:  Normal skin turgor  Neuro:  Alert, cranial nerves grossly intact  HEENT: NCAT  Neck:  No masses or lesions carotids are +2/4. No bruits heard  Lungs:  Good respiratory effort   Abdomen: Soft, nontender.  Normal bowel sounds.  No masses  Vulva: No external lesions, normal hair distribution, no adenopathy  BUS:  Normal, no masses noted  Urethral meatus:  No masses  noted  Urethra:  No hypermobility  Vagina: atrophic, no lesions  Cervix: absent   Uterus: absent   Extremities:  No clubbing, cyanosis or edema.        ASSESSMENT:  Vaginal bleeding, likely secondary to early atrophic vaginal changes.       PLAN:  The exam was largely benign, some mild atrophic changes.  We discussed the use of estrogen for the atrophic changes and she is interested.  She had the cream previously, but she is interested in the estradiol tablets.   Estradiol prescription (Vagifem). X 1 year.     Total time preparing to see patient with reviewing prior encounter and labs, face to face time,  and coordinating care on the same calendar date:  30 minutes.     Anibal Arriaza MD

## 2022-05-03 ENCOUNTER — HOSPITAL ENCOUNTER (OUTPATIENT)
Dept: CARDIOLOGY | Facility: HOSPITAL | Age: 67
Discharge: HOME | End: 2022-05-03
Attending: FAMILY MEDICINE | Admitting: FAMILY MEDICINE
Payer: MEDICARE

## 2022-05-03 DIAGNOSIS — Z82.49 FAMILY HISTORY OF ISCHEMIC HEART DISEASE: ICD-10-CM

## 2022-05-03 DIAGNOSIS — Z91.89 AT RISK FOR ACUTE ISCHEMIC CARDIAC EVENT: ICD-10-CM

## 2022-05-03 DIAGNOSIS — R93.1 ELEVATED CORONARY ARTERY CALCIUM SCORE: ICD-10-CM

## 2022-05-03 DIAGNOSIS — R93.89 ABNORMAL CT OF THE CHEST: ICD-10-CM

## 2022-05-03 LAB
CV STRESS CURRENT BP HE: NORMAL
CV STRESS CURRENT HR HE: 107
CV STRESS CURRENT HR HE: 107
CV STRESS CURRENT HR HE: 110
CV STRESS CURRENT HR HE: 114
CV STRESS CURRENT HR HE: 123
CV STRESS CURRENT HR HE: 126
CV STRESS CURRENT HR HE: 131
CV STRESS CURRENT HR HE: 131
CV STRESS CURRENT HR HE: 134
CV STRESS CURRENT HR HE: 143
CV STRESS CURRENT HR HE: 87
CV STRESS CURRENT HR HE: 89
CV STRESS CURRENT HR HE: 90
CV STRESS CURRENT HR HE: 90
CV STRESS CURRENT HR HE: 91
CV STRESS CURRENT HR HE: 93
CV STRESS CURRENT HR HE: 93
CV STRESS CURRENT HR HE: 98
CV STRESS DEVIATION TIME HE: NORMAL
CV STRESS ECHO PERCENT HR HE: NORMAL
CV STRESS EXERCISE STAGE HE: NORMAL
CV STRESS EXERCISE STAGE REACHED HE: NORMAL
CV STRESS FINAL RESTING BP HE: NORMAL
CV STRESS FINAL RESTING HR HE: 93
CV STRESS MAX HR HE: 143
CV STRESS MAX TREADMILL GRADE HE: 12
CV STRESS MAX TREADMILL SPEED HE: 2.5
CV STRESS PEAK DIA BP HE: NORMAL
CV STRESS PEAK SYS BP HE: NORMAL
CV STRESS PHASE HE: NORMAL
CV STRESS PROTOCOL HE: NORMAL
CV STRESS REASON STOPPED HE: NORMAL
CV STRESS RESTING PT POSITION HE: NORMAL
CV STRESS RESTING PT POSITION HE: NORMAL
CV STRESS ST DEVIATION AMOUNT HE: NORMAL
CV STRESS ST DEVIATION ELEVATION HE: NORMAL
CV STRESS ST EVELATION AMOUNT HE: NORMAL
CV STRESS SYMPTOMS HE: NORMAL
CV STRESS TEST TYPE HE: NORMAL
CV STRESS TOTAL STAGE TIME MIN 1 HE: NORMAL
STRESS ECHO BASELINE DIASTOLIC HE: 72
STRESS ECHO BASELINE HR: 85
STRESS ECHO BASELINE SYSTOLIC BP: 149
STRESS ECHO LAST STRESS DIASTOLIC BP: 88
STRESS ECHO LAST STRESS HR: 143
STRESS ECHO LAST STRESS SYSTOLIC BP: 180
STRESS ECHO POST ESTIMATED WORKLOAD: 5.8
STRESS ECHO POST EXERCISE DUR MIN: 4
STRESS ECHO POST EXERCISE DUR SEC: 0
STRESS ECHO TARGET HR: 129

## 2022-05-03 PROCEDURE — 93018 CV STRESS TEST I&R ONLY: CPT | Performed by: INTERNAL MEDICINE

## 2022-05-03 PROCEDURE — 93016 CV STRESS TEST SUPVJ ONLY: CPT | Performed by: INTERNAL MEDICINE

## 2022-05-03 PROCEDURE — 93017 CV STRESS TEST TRACING ONLY: CPT

## 2022-08-22 ENCOUNTER — TELEPHONE (OUTPATIENT)
Dept: FAMILY MEDICINE | Facility: CLINIC | Age: 67
End: 2022-08-22

## 2022-08-22 NOTE — TELEPHONE ENCOUNTER
Patient Quality Outreach      Summary:    Patient has the following on her problem list/HM: None    Patient is due/failing the following:   Physical Annual Wellness Visit    Type of outreach:    none patient has visit scheduled.    Questions for provider review:    None                                                                                                                                     Kori Kramer MA       Chart routed to none.

## 2022-10-02 ASSESSMENT — ENCOUNTER SYMPTOMS
NAUSEA: 0
MYALGIAS: 1
DYSURIA: 0
BREAST MASS: 0
DIZZINESS: 1
HEADACHES: 0
SORE THROAT: 0
ABDOMINAL PAIN: 0
JOINT SWELLING: 0
COUGH: 0
PALPITATIONS: 1
DIARRHEA: 0
FREQUENCY: 0
SHORTNESS OF BREATH: 1
HEARTBURN: 0
PARESTHESIAS: 0
CHILLS: 0
WEAKNESS: 0
FEVER: 0
HEMATOCHEZIA: 0
EYE PAIN: 0
HEMATURIA: 0
ARTHRALGIAS: 1
NERVOUS/ANXIOUS: 0
CONSTIPATION: 0

## 2022-10-02 ASSESSMENT — ACTIVITIES OF DAILY LIVING (ADL): CURRENT_FUNCTION: NO ASSISTANCE NEEDED

## 2022-10-03 ENCOUNTER — HEALTH MAINTENANCE LETTER (OUTPATIENT)
Age: 67
End: 2022-10-03

## 2022-10-04 ENCOUNTER — OFFICE VISIT (OUTPATIENT)
Dept: FAMILY MEDICINE | Facility: CLINIC | Age: 67
End: 2022-10-04
Payer: MEDICARE

## 2022-10-04 VITALS
WEIGHT: 207.25 LBS | TEMPERATURE: 98 F | HEART RATE: 68 BPM | OXYGEN SATURATION: 97 % | RESPIRATION RATE: 16 BRPM | BODY MASS INDEX: 33.31 KG/M2 | HEIGHT: 66 IN | SYSTOLIC BLOOD PRESSURE: 146 MMHG | DIASTOLIC BLOOD PRESSURE: 90 MMHG

## 2022-10-04 DIAGNOSIS — Z13.1 SCREENING FOR DIABETES MELLITUS: ICD-10-CM

## 2022-10-04 DIAGNOSIS — Z00.00 ANNUAL PHYSICAL EXAM: Primary | ICD-10-CM

## 2022-10-04 DIAGNOSIS — Z13.220 SCREENING FOR LIPID DISORDERS: ICD-10-CM

## 2022-10-04 DIAGNOSIS — Z12.31 VISIT FOR SCREENING MAMMOGRAM: ICD-10-CM

## 2022-10-04 DIAGNOSIS — N95.2 ATROPHY OF VAGINA: ICD-10-CM

## 2022-10-04 LAB
CHOLEST SERPL-MCNC: 152 MG/DL
FASTING STATUS PATIENT QL REPORTED: NO
GLUCOSE SERPL-MCNC: 97 MG/DL (ref 70–99)
HDLC SERPL-MCNC: 43 MG/DL
LDLC SERPL CALC-MCNC: 87 MG/DL
NONHDLC SERPL-MCNC: 109 MG/DL
TRIGL SERPL-MCNC: 108 MG/DL

## 2022-10-04 PROCEDURE — G0439 PPPS, SUBSEQ VISIT: HCPCS | Performed by: FAMILY MEDICINE

## 2022-10-04 PROCEDURE — 36415 COLL VENOUS BLD VENIPUNCTURE: CPT | Performed by: FAMILY MEDICINE

## 2022-10-04 PROCEDURE — 82947 ASSAY GLUCOSE BLOOD QUANT: CPT | Performed by: FAMILY MEDICINE

## 2022-10-04 PROCEDURE — 80061 LIPID PANEL: CPT | Performed by: FAMILY MEDICINE

## 2022-10-04 RX ORDER — ESTRADIOL 10 UG/1
10 INSERT VAGINAL
Qty: 12 TABLET | Refills: 4 | Status: SHIPPED | OUTPATIENT
Start: 2022-10-06

## 2022-10-04 RX ORDER — ATORVASTATIN CALCIUM 10 MG/1
10 TABLET, FILM COATED ORAL DAILY
Qty: 30 TABLET | Refills: 0 | Status: SHIPPED | OUTPATIENT
Start: 2022-10-04 | End: 2022-10-04

## 2022-10-04 RX ORDER — LOVASTATIN 20 MG
10 TABLET ORAL AT BEDTIME
Qty: 45 TABLET | Refills: 3 | Status: SHIPPED | OUTPATIENT
Start: 2022-10-04 | End: 2023-06-23

## 2022-10-04 ASSESSMENT — ACTIVITIES OF DAILY LIVING (ADL): CURRENT_FUNCTION: NO ASSISTANCE NEEDED

## 2022-10-04 NOTE — PROGRESS NOTES
"SUBJECTIVE:   Alysha is a 67 year old who presents for Preventive Visit.    {Split Bill scripting  The purpose of this visit is to discuss your medical history and prevent health problems before you are sick. You may be responsible for a co-pay, coinsurance, or deductible if your visit today includes services such as checking on a sore throat, having an x-ray or lab test, or treating and evaluating a new or existing condition :  Patient has been advised of split billing requirements and indicates understanding: Yes  Are you in the first 12 months of your Medicare coverage?  No    Healthy Habits:     In general, how would you rate your overall health?  Good    Frequency of exercise:  1 day/week    Duration of exercise:  Less than 15 minutes    Do you usually eat at least 4 servings of fruit and vegetables a day, include whole grains    & fiber and avoid regularly eating high fat or \"junk\" foods?  No    Taking medications regularly:  Yes    Medication side effects:  Muscle aches    Ability to successfully perform activities of daily living:  No assistance needed    Home Safety:  No safety concerns identified    Hearing Impairment:  Difficulty following a conversation in a noisy restaurant or crowded room    In the past 6 months, have you been bothered by leaking of urine? Yes    In general, how would you rate your overall mental or emotional health?  Excellent      PHQ-2 Total Score: 0    Additional concerns today:  No    Do you feel safe in your environment? Yes    Have you ever done Advance Care Planning? (For example, a Health Directive, POLST, or a discussion with a medical provider or your loved ones about your wishes): Yes, patient states has an Advance Care Planning document and will bring a copy to the clinic.        Fall risk  Fallen 2 or more times in the past year?: No  Any fall with injury in the past year?: No    Cognitive Screening   1) Repeat 3 items (Leader, Season, Table)    2) Clock draw: NORMAL  3) " 3 item recall: Recalls 3 objects  Results: NORMAL clock, 1-2 items recalled: COGNITIVE IMPAIRMENT LESS LIKELY    Mini-CogTM Copyright ANJANA Briceño. Licensed by the author for use in [facility]; reprinted with permission (juju@[facility]). All rights reserved.          Reviewed and updated as needed this visit by clinical staff   Tobacco  Allergies  Meds   Med Hx  Surg Hx  Fam Hx  Soc Hx          Reviewed and updated as needed this visit by Provider                   Social History     Tobacco Use     Smoking status: Former Smoker     Packs/day: 1.50     Years: 46.00     Pack years: 69.00     Types: Cigarettes     Start date: 1969     Quit date: 2015     Years since quittin.6     Smokeless tobacco: Never Used   Substance Use Topics     Alcohol use: Yes     Comment: Rarely consume alcohol         Alcohol Use 10/2/2022   Prescreen: >3 drinks/day or >7 drinks/week? Not Applicable   Prescreen: >3 drinks/day or >7 drinks/week? -               Current providers sharing in care for this patient include:   Patient Care Team:  No Ref-Primary, Physician as PCP - Martha William MD as Assigned PCP  Anibal Arriaza MD as Assigned OBGYN Provider    The following health maintenance items are reviewed in Epic and correct as of today:  Health Maintenance   Topic Date Due     ANNUAL REVIEW OF HM ORDERS  Never done     COVID-19 Vaccine (4 - Booster for Tiffany series) 2022     MEDICARE ANNUAL WELLNESS VISIT  2022     MAMMO SCREENING  2022     LUNG CANCER SCREENING  10/12/2022     FALL RISK ASSESSMENT  10/04/2023     COLORECTAL CANCER SCREENING  2026     LIPID  2026     ADVANCE CARE PLANNING  2026     DTAP/TDAP/TD IMMUNIZATION (3 - Td or Tdap) 2028     DEXA  2036     HEPATITIS C SCREENING  Completed     PHQ-2 (once per calendar year)  Completed     INFLUENZA VACCINE  Completed     Pneumococcal Vaccine: 65+ Years  Completed     ZOSTER IMMUNIZATION   "Completed     IPV IMMUNIZATION  Aged Out     MENINGITIS IMMUNIZATION  Aged Out     HEPATITIS B IMMUNIZATION  Aged Out     Lab work is in process      FHS-7:   Breast CA Risk Assessment (FHS-7) 8/1/2021 8/18/2021 10/2/2022   Did any of your first-degree relatives have breast or ovarian cancer? Yes Yes Yes   Did any of your relatives have bilateral breast cancer? No No No   Did any man in your family have breast cancer? No No No   Did any woman in your family have breast and ovarian cancer? No No No   Did any woman in your family have breast cancer before age 50 y? No No No   Do you have 2 or more relatives with breast and/or ovarian cancer? No Yes No   Do you have 2 or more relatives with breast and/or bowel cancer? No No No       Mammogram Screening: Recommended mammography every 1-2 years with patient discussion and risk factor consideration  Pertinent mammograms are reviewed under the imaging tab.    CONSTITUTIONAL: NEGATIVE for fever, chills, change in weight  ENT/MOUTH: NEGATIVE for ear, mouth and throat problems  RESP: NEGATIVE for significant cough or SOB  CV: NEGATIVE for chest pain, palpitations or peripheral edema    OBJECTIVE:   BP (!) 156/94 (BP Location: Right arm, Patient Position: Sitting, Cuff Size: Adult Regular)   Pulse 68   Temp 98  F (36.7  C) (Oral)   Resp 16   Ht 1.664 m (5' 5.5\")   Wt 94 kg (207 lb 4 oz)   SpO2 97%   BMI 33.96 kg/m   Estimated body mass index is 33.96 kg/m  as calculated from the following:    Height as of this encounter: 1.664 m (5' 5.5\").    Weight as of this encounter: 94 kg (207 lb 4 oz).  Physical Exam  GENERAL: healthy, alert and no distress  NECK: no adenopathy, no asymmetry, masses, or scars and thyroid normal to palpation  RESP: lungs clear to auscultation - no rales, rhonchi or wheezes  CV: regular rate and rhythm, normal S1 S2, no S3 or S4, no murmur, click or rub, no peripheral edema and peripheral pulses strong        ASSESSMENT / PLAN:   Annual physical " "exam  BP slightly elevated.  We will continue to monitor.    Atrophy of vagina  Saw GYN.  Med refill needed  - estradiol (VAGIFEM) 10 MCG TABS vaginal tablet; Place 1 tablet (10 mcg) vaginally twice a week    Visit for screening mammogram  - MA SCREENING DIGITAL BILAT - Future  (s+30); Future    Screening for lipid disorders  Having muscle aches and pain with Lipitor 10 mg.  Discussed with our in-house MTM pharmacist.  We will try lovastatin and follow-up in 3 months.  - Lipid panel    Screening for diabetes mellitus  - Glucose        COUNSELING:  Reviewed preventive health counseling, as reflected in patient instructions       Regular exercise       Healthy diet/nutrition    Estimated body mass index is 33.96 kg/m  as calculated from the following:    Height as of this encounter: 1.664 m (5' 5.5\").    Weight as of this encounter: 94 kg (207 lb 4 oz).        She reports that she quit smoking about 7 years ago. Her smoking use included cigarettes. She started smoking about 53 years ago. She has a 69.00 pack-year smoking history. She has never used smokeless tobacco.      Appropriate preventive services were discussed with this patient, including applicable screening as appropriate for cardiovascular disease, diabetes, osteopenia/osteoporosis, and glaucoma.  As appropriate for age/gender, discussed screening for colorectal cancer, prostate cancer, breast cancer, and cervical cancer. Checklist reviewing preventive services available has been given to the patient.    Reviewed patients plan of care and provided an AVS. The  gurwinder Pono meets the Care Plan requirement.    Counseling Resources:  ATP IV Guidelines  Pooled Cohorts Equation Calculator  Breast Cancer Risk Calculator  Breast Cancer: Medication to Reduce Risk  FRAX Risk Assessment  ICSI Preventive Guidelines  Dietary Guidelines for Americans, 2010  USDA's MyPlate  ASA Prophylaxis  Lung CA Screening    Jorge Connors MD  [facility] " KAMILA    Identified Health Risks:  Answers for HPI/ROS submitted by the patient on 10/2/2022  abdominal pain: No  Blood in stool: No  Blood in urine: No  chest pain: Yes  chills: No  congestion: No  constipation: No  cough: No  diarrhea: No  dizziness: Yes  ear pain: No  eye pain: No  nervous/anxious: No  fever: No  frequency: No  genital sores: No  headaches: No  hearing loss: Yes  heartburn: No  arthralgias: Yes  joint swelling: No  peripheral edema: No  mood changes: No  myalgias: Yes  nausea: No  dysuria: No  palpitations: Yes  Skin sensation changes: No  sore throat: No  urgency: No  rash: No  shortness of breath: Yes  visual disturbance: Yes  weakness: No  pelvic pain: No  vaginal bleeding: Yes  vaginal discharge: No  tenderness: No  breast mass: No  breast discharge: No

## 2022-10-06 ENCOUNTER — MYC MEDICAL ADVICE (OUTPATIENT)
Dept: FAMILY MEDICINE | Facility: CLINIC | Age: 67
End: 2022-10-06

## 2022-10-06 DIAGNOSIS — Z12.2 SCREENING FOR LUNG CANCER: Primary | ICD-10-CM

## 2022-10-06 DIAGNOSIS — Z87.891 FORMER SMOKER, STOPPED SMOKING MANY YEARS AGO: ICD-10-CM

## 2022-10-06 NOTE — TELEPHONE ENCOUNTER
Responded to patient via [employer].  Patient request a CT scan.      ANY MartinezN, RN  [facility]

## 2022-10-07 NOTE — TELEPHONE ENCOUNTER
Dr. Rosa- pt is requesting an order for her annual CT scan.    - pended CT referral. Please review and sign if appropriate.    ANY Carrera CemN RN  [facility]

## 2022-10-11 ENCOUNTER — MYC MEDICAL ADVICE (OUTPATIENT)
Dept: FAMILY MEDICINE | Facility: CLINIC | Age: 67
End: 2022-10-11

## 2022-10-11 NOTE — TELEPHONE ENCOUNTER
Please see patient message below via [email or web address].     Dr. Connors,  Your nurse/assistant sent my request for the low dose CT scan to Dr Martha Rosa.  At my appointment on October 4th I had requested that you become my primary doctor and even though you weren t taking new patients you made an exception.  Thank you.    I would like to get the Low dose CT scan scheduled since it s been a year since my last one.  Thank you for your assistance.  Alysha    Thank you    Jin

## 2022-10-12 ENCOUNTER — ANCILLARY PROCEDURE (OUTPATIENT)
Dept: MAMMOGRAPHY | Facility: CLINIC | Age: 67
End: 2022-10-12
Attending: FAMILY MEDICINE
Payer: MEDICARE

## 2022-10-12 DIAGNOSIS — R92.1 BREAST CALCIFICATIONS: ICD-10-CM

## 2022-10-12 PROCEDURE — 77065 DX MAMMO INCL CAD UNI: CPT | Mod: RT

## 2022-11-07 ENCOUNTER — ANCILLARY PROCEDURE (OUTPATIENT)
Dept: CT IMAGING | Facility: CLINIC | Age: 67
End: 2022-11-07
Attending: FAMILY MEDICINE
Payer: MEDICARE

## 2022-11-07 PROCEDURE — 71271 CT THORAX LUNG CANCER SCR C-: CPT | Mod: TC | Performed by: RADIOLOGY

## 2022-11-18 ENCOUNTER — MYC MEDICAL ADVICE (OUTPATIENT)
Dept: FAMILY MEDICINE | Facility: CLINIC | Age: 67
End: 2022-11-18

## 2022-11-18 DIAGNOSIS — Z13.0 SCREENING FOR DEFICIENCY ANEMIA: ICD-10-CM

## 2022-11-18 DIAGNOSIS — E78.49 OTHER HYPERLIPIDEMIA: Primary | ICD-10-CM

## 2022-11-18 DIAGNOSIS — M79.10 MUSCLE ACHE: ICD-10-CM

## 2022-11-18 DIAGNOSIS — G25.81 RESTLESS LEG SYNDROME: Primary | ICD-10-CM

## 2022-11-18 RX ORDER — ATORVASTATIN CALCIUM 10 MG/1
10 TABLET, FILM COATED ORAL DAILY
Qty: 90 TABLET | Refills: 1 | Status: SHIPPED | OUTPATIENT
Start: 2022-11-18 | End: 2023-06-15

## 2022-11-30 ENCOUNTER — LAB (OUTPATIENT)
Dept: LAB | Facility: CLINIC | Age: 67
End: 2022-11-30
Payer: MEDICARE

## 2022-11-30 DIAGNOSIS — Z13.0 SCREENING FOR DEFICIENCY ANEMIA: ICD-10-CM

## 2022-11-30 DIAGNOSIS — M79.10 MUSCLE ACHE: ICD-10-CM

## 2022-11-30 LAB
CK SERPL-CCNC: 77 U/L (ref 26–192)
ERYTHROCYTE [DISTWIDTH] IN BLOOD BY AUTOMATED COUNT: 13.3 % (ref 10–15)
FERRITIN SERPL-MCNC: 170 NG/ML (ref 11–328)
HCT VFR BLD AUTO: 42.9 % (ref 35–47)
HGB BLD-MCNC: 13.7 G/DL (ref 11.7–15.7)
IRON BINDING CAPACITY (ROCHE): 294 UG/DL (ref 240–430)
IRON SATN MFR SERPL: 28 % (ref 15–46)
IRON SERPL-MCNC: 83 UG/DL (ref 37–145)
MCH RBC QN AUTO: 27.7 PG (ref 26.5–33)
MCHC RBC AUTO-ENTMCNC: 31.9 G/DL (ref 31.5–36.5)
MCV RBC AUTO: 87 FL (ref 78–100)
PLATELET # BLD AUTO: 294 10E3/UL (ref 150–450)
RBC # BLD AUTO: 4.95 10E6/UL (ref 3.8–5.2)
WBC # BLD AUTO: 8 10E3/UL (ref 4–11)

## 2022-11-30 PROCEDURE — 83540 ASSAY OF IRON: CPT | Performed by: FAMILY MEDICINE

## 2022-11-30 PROCEDURE — 36415 COLL VENOUS BLD VENIPUNCTURE: CPT | Performed by: FAMILY MEDICINE

## 2022-11-30 PROCEDURE — 85027 COMPLETE CBC AUTOMATED: CPT | Performed by: FAMILY MEDICINE

## 2022-11-30 PROCEDURE — 82550 ASSAY OF CK (CPK): CPT | Performed by: FAMILY MEDICINE

## 2022-11-30 PROCEDURE — 82728 ASSAY OF FERRITIN: CPT | Performed by: FAMILY MEDICINE

## 2022-11-30 PROCEDURE — 83550 IRON BINDING TEST: CPT | Performed by: FAMILY MEDICINE

## 2023-03-21 ENCOUNTER — MYC MEDICAL ADVICE (OUTPATIENT)
Dept: FAMILY MEDICINE | Facility: CLINIC | Age: 68
End: 2023-03-21
Payer: MEDICARE

## 2023-03-21 NOTE — TELEPHONE ENCOUNTER
Dr. Flores pt received her 4th COVID booster in October. Does pt need the 5th or is it just recommended?    Nakul Ramos, BSN RN  [facility]

## 2023-04-06 ENCOUNTER — ANCILLARY PROCEDURE (OUTPATIENT)
Dept: GENERAL RADIOLOGY | Facility: CLINIC | Age: 68
End: 2023-04-06
Attending: PEDIATRICS
Payer: MEDICARE

## 2023-04-06 ENCOUNTER — OFFICE VISIT (OUTPATIENT)
Dept: ORTHOPEDICS | Facility: CLINIC | Age: 68
End: 2023-04-06
Payer: MEDICARE

## 2023-04-06 ENCOUNTER — TELEPHONE (OUTPATIENT)
Dept: FAMILY MEDICINE | Facility: CLINIC | Age: 68
End: 2023-04-06

## 2023-04-06 VITALS
HEART RATE: 70 BPM | BODY MASS INDEX: 33.92 KG/M2 | SYSTOLIC BLOOD PRESSURE: 157 MMHG | WEIGHT: 207 LBS | DIASTOLIC BLOOD PRESSURE: 83 MMHG

## 2023-04-06 DIAGNOSIS — G89.29 CHRONIC RIGHT HIP PAIN: ICD-10-CM

## 2023-04-06 DIAGNOSIS — M25.551 CHRONIC RIGHT HIP PAIN: ICD-10-CM

## 2023-04-06 DIAGNOSIS — G89.29 CHRONIC PAIN OF RIGHT HIP: Primary | ICD-10-CM

## 2023-04-06 DIAGNOSIS — M25.551 CHRONIC PAIN OF RIGHT HIP: Primary | ICD-10-CM

## 2023-04-06 DIAGNOSIS — G89.29 CHRONIC LEFT HIP PAIN: ICD-10-CM

## 2023-04-06 DIAGNOSIS — Z12.31 VISIT FOR SCREENING MAMMOGRAM: Primary | ICD-10-CM

## 2023-04-06 DIAGNOSIS — M25.552 CHRONIC LEFT HIP PAIN: ICD-10-CM

## 2023-04-06 DIAGNOSIS — R92.8 ABNORMAL MAMMOGRAM: ICD-10-CM

## 2023-04-06 DIAGNOSIS — M25.559 GREATER TROCHANTERIC PAIN SYNDROME: ICD-10-CM

## 2023-04-06 PROCEDURE — 99203 OFFICE O/P NEW LOW 30 MIN: CPT | Performed by: PEDIATRICS

## 2023-04-06 PROCEDURE — 73502 X-RAY EXAM HIP UNI 2-3 VIEWS: CPT | Mod: TC | Performed by: RADIOLOGY

## 2023-04-06 NOTE — LETTER
4/6/2023         RE: Ayah Garcia  [address]  [place] [address]        Dear Colleague,    Thank you for referring your patient, Ayah Garcia, to the [facility]. Please see a copy of my visit note below.    ASSESSMENT & PLAN    Ayah was seen today for pain.    Diagnoses and all orders for this visit:    Chronic pain of right hip  -     Cancel: XR Pelvis w Hip LT 1 View; Future  -     Physical Therapy Referral; Future    Greater trochanteric pain syndrome  -     Physical Therapy Referral; Future      This issue is acute on chronic and Unchanged.    Right hip pain, likely some component of early arthritis, greater trochanteric pain syndrome.    Discussed the diagnosis of greater trochanteric pain syndrome and contributing factors to lateral hip pain including gluteal tendinopathy, IT Band Syndrome and weak hip abductor muscles.  Discussed that these factors can cause inflammation in the greater trochanteric bursa.  I recommend physical therapy for overall hip strengthening.  Discussed that injections are sometimes helpful for point tenderness over the bursa, however, will not improve overall hip strength.  Would consider further work up and treatment pending clinical course.    Plan:  - Today's Plan of Care:  Rehab: Physical Therapy: [facility] - [phone]    Discussed activity considerations and other supportive care including Ice/Heat, OTC and other topical medications as needed.    -We also discussed other future treatment options:  MRI Right Hip  Consideration of injection (Trochanteric Bursa, US guided right hip injection)    Follow Up: 6-8 weeks    Concerning signs and symptoms were reviewed.  The patient expressed understanding of this management plan and all questions were answered at this time.    Litzy Nance MD [facility]  Sports Medicine Physician  [facility]      -----  Chief Complaint   Patient  presents with     Left Hip - Pain       SUBJECTIVE  Ayah Garcia is a/an 68 year old female who is seen as a self referral for evaluation of Right Hip Pain. Reports issues since 2015 and diagnosed as Hip Bursitis. Had injections that didn't seem to help. In the last year it has gotten worse. Went on vacation and came back in the middle of March and has gotten worse since. Walking used to help.    The patient is seen by themselves.    Onset: 8 years(s) ago. Reports insidious onset without acute precipitating event.  Location of Pain: right hip, from glute to groin, but not all at once  Worsened by: Walking, Up-stairs is painful, laying down on the left hip  Better with: Ice, activity avoidance (golfs)  Treatments tried: rest/activity avoidance, ice, Tylenol, ibuprofen and home exercises (stretches)  Associated symptoms: no distal numbness or tingling; denies swelling or warmth    Orthopedic/Surgical history: NO  Social History/Occupation: Retired      REVIEW OF SYSTEMS:  Review of Systems  Skin: no bruising, no swelling  Musculoskeletal: as above  Neurologic: no numbness, paresthesias  Remainder of review of systems is negative including constitutional, CV, pulmonary, GI, except as noted in HPI or medical history.    OBJECTIVE:  BP (!) 157/83   Pulse 70   Wt 93.9 kg (207 lb)   BMI 33.92 kg/m     General: healthy, alert and in no distress  HEENT: no scleral icterus or conjunctival erythema  Skin: no suspicious lesions or rash. No jaundice.  CV: distal perfusion intact  Resp: normal respiratory effort without conversational dyspnea   Psych: normal mood and affect  Gait: NORMAL  Neuro: Normal light sensory exam of lower extremity    Bilateral hip exam    Inspection:      no edema or ecchymosis in hip area    Tender:      Mild bursa pain    Non Tender:      remainder of the hip area bilateral    ROM:     Full active and passive ROM  Bilateral  - mild pain with hip ROM    Strength:      flexion 5/5 bilateral        abduction 5/5 bilateral       adduction 5/5 bilateral    Sensation:      grossly intact in hip and thigh    Special Tests:      neg (-) EFREN right       positive (+) FADIR right      RADIOLOGY:  I independently ordered, visualized and reviewed these images with the patient  AP Pelvis and right lateral XR views of hips reviewed: no acute bony abnormality, mild degenerative change  - will follow official read        Review of the result(s) of each unique test - XR             Again, thank you for allowing me to participate in the care of your patient.        Sincerely,        Litzy Nance MD

## 2023-04-06 NOTE — PROGRESS NOTES
ASSESSMENT & PLAN    Ayah was seen today for pain.    Diagnoses and all orders for this visit:    Chronic pain of right hip  -     Cancel: XR Pelvis w Hip LT 1 View; Future  -     Physical Therapy Referral; Future    Greater trochanteric pain syndrome  -     Physical Therapy Referral; Future      This issue is acute on chronic and Unchanged.    Right hip pain, likely some component of early arthritis, greater trochanteric pain syndrome.    Discussed the diagnosis of greater trochanteric pain syndrome and contributing factors to lateral hip pain including gluteal tendinopathy, IT Band Syndrome and weak hip abductor muscles.  Discussed that these factors can cause inflammation in the greater trochanteric bursa.  I recommend physical therapy for overall hip strengthening.  Discussed that injections are sometimes helpful for point tenderness over the bursa, however, will not improve overall hip strength.  Would consider further work up and treatment pending clinical course.    Plan:  - Today's Plan of Care:  Rehab: Physical Therapy: [facility] - [phone]    Discussed activity considerations and other supportive care including Ice/Heat, OTC and other topical medications as needed.    -We also discussed other future treatment options:  MRI Right Hip  Consideration of injection (Trochanteric Bursa, US guided right hip injection)    Follow Up: 6-8 weeks    Concerning signs and symptoms were reviewed.  The patient expressed understanding of this management plan and all questions were answered at this time.    Litzy Nance MD [facility]  Sports Medicine Physician  [facility]      -----  Chief Complaint   Patient presents with     Left Hip - Pain       SUBJECTIVE  Ayah Garcia is a/an 68 year old female who is seen as a self referral for evaluation of Right Hip Pain. Reports issues since 2015 and diagnosed as Hip Bursitis. Had injections that didn't seem to help. In the last year it has  gotten worse. Went on vacation and came back in the middle of March and has gotten worse since. Walking used to help.    The patient is seen by themselves.    Onset: 8 years(s) ago. Reports insidious onset without acute precipitating event.  Location of Pain: right hip, from glute to groin, but not all at once  Worsened by: Walking, Up-stairs is painful, laying down on the left hip  Better with: Ice, activity avoidance (golfs)  Treatments tried: rest/activity avoidance, ice, Tylenol, ibuprofen and home exercises (stretches)  Associated symptoms: no distal numbness or tingling; denies swelling or warmth    Orthopedic/Surgical history: NO  Social History/Occupation: Retired      REVIEW OF SYSTEMS:  Review of Systems  Skin: no bruising, no swelling  Musculoskeletal: as above  Neurologic: no numbness, paresthesias  Remainder of review of systems is negative including constitutional, CV, pulmonary, GI, except as noted in HPI or medical history.    OBJECTIVE:  BP (!) 157/83   Pulse 70   Wt 93.9 kg (207 lb)   BMI 33.92 kg/m     General: healthy, alert and in no distress  HEENT: no scleral icterus or conjunctival erythema  Skin: no suspicious lesions or rash. No jaundice.  CV: distal perfusion intact  Resp: normal respiratory effort without conversational dyspnea   Psych: normal mood and affect  Gait: NORMAL  Neuro: Normal light sensory exam of lower extremity    Bilateral hip exam    Inspection:      no edema or ecchymosis in hip area    Tender:      Mild bursa pain    Non Tender:      remainder of the hip area bilateral    ROM:     Full active and passive ROM  Bilateral  - mild pain with hip ROM    Strength:      flexion 5/5 bilateral       abduction 5/5 bilateral       adduction 5/5 bilateral    Sensation:      grossly intact in hip and thigh    Special Tests:      neg (-) EFREN right       positive (+) FADIR right      RADIOLOGY:  I independently ordered, visualized and reviewed these images with the patient  AP  Pelvis and right lateral XR views of hips reviewed: no acute bony abnormality, mild degenerative change  - will follow official read        Review of the result(s) of each unique test - XR

## 2023-04-06 NOTE — TELEPHONE ENCOUNTER
Order/Referral Request    Who is requesting: Sylvester from radiology    Orders being requested: rt diagnostic mammo    Reason service is needed/diagnosis: abnormal mammo    When are orders needed by: ASAP    Has this been discussed with Provider: Yes    Does patient have a preference on a Group/Provider/Facility? Unknown     Does patient have an appointment scheduled?: No    Where to send orders: Place orders within Epic    Could we send this information to you in [facility] or would you prefer to receive a phone call?:   No preference     Okay to leave a detailed message?: No  Radiology will call pt Thanks    Call taken on 4/6/23 at 3 pm by ILSA Barksdale

## 2023-04-06 NOTE — PATIENT INSTRUCTIONS
Right hip pain, likely some component of early arthritis, greater trochanteric pain syndrome.    Discussed the diagnosis of greater trochanteric pain syndrome and contributing factors to lateral hip pain including gluteal tendinopathy, IT Band Syndrome and weak hip abductor muscles.  Discussed that these factors can cause inflammation in the greater trochanteric bursa.  I recommend physical therapy for overall hip strengthening.  Discussed that injections are sometimes helpful for point tenderness over the bursa, however, will not improve overall hip strength.  Would consider further work up and treatment pending clinical course.    Plan:  - Today's Plan of Care:  Rehab: Physical Therapy: [facility] - [phone]    Discussed activity considerations and other supportive care including Ice/Heat, OTC and other topical medications as needed.    -We also discussed other future treatment options:  MRI Right Hip  Consideration of injection (Trochanteric Bursa, US guided right hip injection)    Follow Up: 6-8 weeks    If you have any further questions for your physician or physician s care team you can call [phone] and use option 3 to leave a voice message.

## 2023-04-09 NOTE — TELEPHONE ENCOUNTER
A user error has taken place: encounter opened in error, closed for administrative reasons.     flu-like symptoms

## 2023-04-10 ENCOUNTER — ANCILLARY PROCEDURE (OUTPATIENT)
Dept: MAMMOGRAPHY | Facility: CLINIC | Age: 68
End: 2023-04-10
Attending: FAMILY MEDICINE
Payer: MEDICARE

## 2023-04-10 DIAGNOSIS — R92.8 ABNORMAL MAMMOGRAM: ICD-10-CM

## 2023-04-10 PROCEDURE — 77065 DX MAMMO INCL CAD UNI: CPT | Mod: RT

## 2023-04-14 ENCOUNTER — TRANSFERRED RECORDS (OUTPATIENT)
Dept: HEALTH INFORMATION MANAGEMENT | Facility: CLINIC | Age: 68
End: 2023-04-14
Payer: MEDICARE

## 2023-04-17 ENCOUNTER — TELEPHONE (OUTPATIENT)
Dept: FAMILY MEDICINE | Facility: CLINIC | Age: 68
End: 2023-04-17
Payer: MEDICARE

## 2023-04-17 NOTE — TELEPHONE ENCOUNTER
Order/Referral Request    Who is requesting: Alysha Garcia     Orders being requested:Order Diagnostic Mammograms    Reason service is needed/diagnosis: Spot that is being watched    When are orders needed by: by October or thereafter October 10th     Has this been discussed with Provider: Yes    Does patient have a preference on a Group/Provider/Facility? [facility]     Does patient have an appointment scheduled?: No    Where to send orders: Place orders within Epic    Could we send this information to you in [employer] or would you prefer to receive a phone call?:   Patient would like to be contacted via [employer]

## 2023-04-18 DIAGNOSIS — R92.1 BREAST CALCIFICATIONS: Primary | ICD-10-CM

## 2023-04-18 NOTE — TELEPHONE ENCOUNTER
Author left detailed (non-specific) message to let her know order has been signed and she can schedule with [facility] imaging at [phone].

## 2023-04-18 NOTE — TELEPHONE ENCOUNTER
The patient verbalizes understanding of provider/CSS instructions for follow-up and continued care per provider message.     Alysha states that she had the mammogram on Apr 10, 2023 and recommend that she come back in 6 months (October 2023 at some point). She wanted to contact imaging and schedule that follow up for October but Imaging told her that they could not book her in October because there was no order.     If possible - patient asking you to order the follow up test so that she can call and schedule her follow up exam in October 2023.

## 2023-05-25 ENCOUNTER — MYC MEDICAL ADVICE (OUTPATIENT)
Dept: FAMILY MEDICINE | Facility: CLINIC | Age: 68
End: 2023-05-25

## 2023-05-25 ENCOUNTER — ANCILLARY PROCEDURE (OUTPATIENT)
Dept: GENERAL RADIOLOGY | Facility: CLINIC | Age: 68
End: 2023-05-25
Attending: PEDIATRICS
Payer: MEDICARE

## 2023-05-25 ENCOUNTER — OFFICE VISIT (OUTPATIENT)
Dept: ORTHOPEDICS | Facility: CLINIC | Age: 68
End: 2023-05-25
Payer: MEDICARE

## 2023-05-25 VITALS
DIASTOLIC BLOOD PRESSURE: 93 MMHG | WEIGHT: 207 LBS | SYSTOLIC BLOOD PRESSURE: 149 MMHG | HEART RATE: 66 BPM | BODY MASS INDEX: 33.92 KG/M2

## 2023-05-25 DIAGNOSIS — M25.511 ACUTE PAIN OF RIGHT SHOULDER: ICD-10-CM

## 2023-05-25 DIAGNOSIS — M25.551 CHRONIC RIGHT HIP PAIN: Primary | ICD-10-CM

## 2023-05-25 DIAGNOSIS — M25.559 GREATER TROCHANTERIC PAIN SYNDROME: ICD-10-CM

## 2023-05-25 DIAGNOSIS — S49.91XA INJURY OF RIGHT SHOULDER, INITIAL ENCOUNTER: ICD-10-CM

## 2023-05-25 DIAGNOSIS — G89.29 CHRONIC RIGHT HIP PAIN: Primary | ICD-10-CM

## 2023-05-25 PROCEDURE — 73030 X-RAY EXAM OF SHOULDER: CPT | Mod: TC | Performed by: RADIOLOGY

## 2023-05-25 PROCEDURE — 99213 OFFICE O/P EST LOW 20 MIN: CPT | Performed by: PEDIATRICS

## 2023-05-25 NOTE — LETTER
5/25/2023         RE: Ayah Garcia  [address]  [place] [address]        Dear Colleague,    Thank you for referring your patient, Ayah Garcia, to the [facility]. Please see a copy of my visit note below.    ASSESSMENT & PLAN    Ayah was seen today for follow up, pain and pain.    Diagnoses and all orders for this visit:    Chronic right hip pain    Greater trochanteric pain syndrome    Acute pain of right shoulder  -     XR Shoulder Right G/E 3 Views; Future  -     Physical Therapy Referral; Future    Injury of right shoulder, initial encounter  -     Physical Therapy Referral; Future      This issue is acute on chronic and Unchanged.    Right Shoulder injury, some concern for RC tear. We discussed the following treatment options: symptom treatment, activity modification/rest, imaging, rehab and referral. Following discussion, plan: will start with supportive care and Home Exercise Program along with PT referral. Consider MRI pending clinical course.    Right hip pain improving, will continue to monitor.    Plan:  - Today's Plan of Care:  Referral to PT for shoulder - [facility]  Discussed activity considerations and other supportive care including Ice/Heat, OTC and other topical medications as needed.    Monitor hip pain    -We also discussed other future treatment options:  MRI right shoulder  Greater trochanteric bursa injection  MRI right hip    Follow Up: 3-4 weeks    Concerning signs and symptoms were reviewed and all questions were answered at this time.    Litzy Nance MD [facility]  Sports Medicine Physician  [facility]      SUBJECTIVE- Interim History May 25, 2023    Chief Complaint   Patient presents with     Right Hip - Follow Up, Pain     Right Shoulder - Pain       Ayah Garcia is a 68 year old female who is seen in f/u up for    Chronic right hip pain  Greater trochanteric pain syndrome  khanh last visit  "on 4/6/23 patient has been feeling better during the day, at night it is still painful. Would also like to discuss right shoulder pain.    1. RIGHT HIP PAIN  Onset: 8 years(s) ago. Reports insidious onset without acute precipitating event.  Location of Pain: right hip, from glute to groin, but not all at once  Worsened by: Walking, Up-stairs is painful, laying down on the left hip  Better with: Ice, activity avoidance (golfs)  Treatments tried: rest/activity avoidance, ice, Tylenol, ibuprofen and home exercises (stretches), [facility] ), HEP, previous imaging (xray 4/6/23)  Associated symptoms: no distal numbness or tingling; denies swelling or warmth    2. NEW: RIGHT SHOULDER  Onset: 3 days(s) ago. Reports lifting up a [employer] size laundry detergent   Location of Pain: right shoulder; AC joint, superior  Worsened by: abduction, flexion, \"in between motion\" of down and up (needs to be supported)  Better with: assisting her arm up, ibuprofen, icing   Treatments tried: icing, ibuprofen   Associated symptoms: weakness, instability, decreased ROM       Orthopedic/Surgical history: NO  Social History/Occupation: Retired      REVIEW OF SYSTEMS:  Review of Systems    OBJECTIVE:  BP (!) 149/93   Pulse 66   Wt 93.9 kg (207 lb)   BMI 33.92 kg/m     General: healthy, alert and in no distress  Skin: no suspicious lesions or rash.  CV: distal perfusion intact  Resp: normal respiratory effort without conversational dyspnea   Psych: normal mood and affect  Gait: NORMAL  Neuro: Normal light sensory exam of upper and lowre extremity    Bilateral Shoulder exam    Inspection and Posture:       rounded shoulders and upper back    Skin:        no visible deformities    Tender:        None currently    Non Tender:       remainder of shoulder bilateral    ROM:        forward flexion 160  right       abduction 160 right       internal rotation gluteal region right       external rotation 45 right    Painful " motions:       flexion right       elevation right    Strength:        abduction 4/5 bilateral       flexion 4/5 bilateral       internal rotation 5/5 bilateral       external rotation 5/5 bilateral    Impingement testing:       neg (-) Neer right       positive (+) [place] right    Sensation:        normal sensation over shoulder and upper extremity     Bilateral hip exam  Inspection:      no edema or ecchymosis in hip area     Tender:      Mild bursa pain     Non Tender:      remainder of the hip area bilateral     ROM:     Full active and passive ROM  Bilateral, no pain with hip ROM     Strength:      flexion 5/5 bilateral       abduction 5/5 bilateral       adduction 5/5 bilateral     Sensation:      grossly intact in hip and thigh     Special Tests:      neg (-) EFREN right       neg (-) FADIR right    RADIOLOGY:  Final results and radiologist's interpretation, available in the [facility] health record.  Images were reviewed with the patient in the office today.  My personal interpretation of the performed imagin XR views of right shoulder reviewed: no acute bony abnormality, mild AC joint degenerative change  - will follow official read     AP Pelvis and right lateral XR views of hips reviewed 2023: no acute bony abnormality, mild degenerative change    Review of the result(s) of each unique test - XR             Again, thank you for allowing me to participate in the care of your patient.        Sincerely,        Litzy Nance MD

## 2023-05-25 NOTE — PROGRESS NOTES
ASSESSMENT & PLAN    Ayah was seen today for follow up, pain and pain.    Diagnoses and all orders for this visit:    Chronic right hip pain    Greater trochanteric pain syndrome    Acute pain of right shoulder  -     XR Shoulder Right G/E 3 Views; Future  -     Physical Therapy Referral; Future    Injury of right shoulder, initial encounter  -     Physical Therapy Referral; Future      This issue is acute on chronic and Unchanged.    Right Shoulder injury, some concern for RC tear. We discussed the following treatment options: symptom treatment, activity modification/rest, imaging, rehab and referral. Following discussion, plan: will start with supportive care and Home Exercise Program along with PT referral. Consider MRI pending clinical course.    Right hip pain improving, will continue to monitor.    Plan:  - Today's Plan of Care:  Referral to PT for shoulder - [facility]  Discussed activity considerations and other supportive care including Ice/Heat, OTC and other topical medications as needed.    Monitor hip pain    -We also discussed other future treatment options:  MRI right shoulder  Greater trochanteric bursa injection  MRI right hip    Follow Up: 3-4 weeks    Concerning signs and symptoms were reviewed and all questions were answered at this time.    Litzy Nance MD [facility]  Sports Medicine Physician  [facility]      SUBJECTIVE- Interim History May 25, 2023    Chief Complaint   Patient presents with     Right Hip - Follow Up, Pain     Right Shoulder - Pain       Ayah Garcia is a 68 year old female who is seen in f/u up for    Chronic right hip pain  Greater trochanteric pain syndrome  khanh last visit on 4/6/23 patient has been feeling better during the day, at night it is still painful. Would also like to discuss right shoulder pain.    1. RIGHT HIP PAIN  Onset: 8 years(s) ago. Reports insidious onset without acute precipitating event.  Location of Pain: right hip, from glute  "to groin, but not all at once  Worsened by: Walking, Up-stairs is painful, laying down on the left hip  Better with: Ice, activity avoidance (golfs)  Treatments tried: rest/activity avoidance, ice, Tylenol, ibuprofen and home exercises (stretches), physical therapy (@ [facility] 4 visits ), HEP, previous imaging (xray 4/6/23)  Associated symptoms: no distal numbness or tingling; denies swelling or warmth    2. NEW: RIGHT SHOULDER  Onset: 3 days(s) ago. Reports lifting up a [employer] size laundry detergent   Location of Pain: right shoulder; AC joint, superior  Worsened by: abduction, flexion, \"in between motion\" of down and up (needs to be supported)  Better with: assisting her arm up, ibuprofen, icing   Treatments tried: icing, ibuprofen   Associated symptoms: weakness, instability, decreased ROM       Orthopedic/Surgical history: NO  Social History/Occupation: Retired      REVIEW OF SYSTEMS:  Review of Systems    OBJECTIVE:  BP (!) 149/93   Pulse 66   Wt 93.9 kg (207 lb)   BMI 33.92 kg/m     General: healthy, alert and in no distress  Skin: no suspicious lesions or rash.  CV: distal perfusion intact  Resp: normal respiratory effort without conversational dyspnea   Psych: normal mood and affect  Gait: NORMAL  Neuro: Normal light sensory exam of upper and lowre extremity    Bilateral Shoulder exam    Inspection and Posture:       rounded shoulders and upper back    Skin:        no visible deformities    Tender:        None currently    Non Tender:       remainder of shoulder bilateral    ROM:        forward flexion 160  right       abduction 160 right       internal rotation gluteal region right       external rotation 45 right    Painful motions:       flexion right       elevation right    Strength:        abduction 4/5 bilateral       flexion 4/5 bilateral       internal rotation 5/5 bilateral       external rotation 5/5 bilateral    Impingement testing:       neg (-) Neer right       positive (+) [place] " right    Sensation:        normal sensation over shoulder and upper extremity     Bilateral hip exam  Inspection:      no edema or ecchymosis in hip area     Tender:      Mild bursa pain     Non Tender:      remainder of the hip area bilateral     ROM:     Full active and passive ROM  Bilateral, no pain with hip ROM     Strength:      flexion 5/5 bilateral       abduction 5/5 bilateral       adduction 5/5 bilateral     Sensation:      grossly intact in hip and thigh     Special Tests:      neg (-) EFREN right       neg (-) FADIR right    RADIOLOGY:  Final results and radiologist's interpretation, available in the [facility] health record.  Images were reviewed with the patient in the office today.  My personal interpretation of the performed imagin XR views of right shoulder reviewed: no acute bony abnormality, mild AC joint degenerative change  - will follow official read     AP Pelvis and right lateral XR views of hips reviewed 2023: no acute bony abnormality, mild degenerative change    Review of the result(s) of each unique test - XR

## 2023-05-25 NOTE — TELEPHONE ENCOUNTER
Called pt in an attempt to assist in scheduling appt per provider request. Left message to call back. mychart message also sent to pt.    If pt calls back, please assist in scheduling appt with Dr. Connors.    Nakul Ramos, BSN RN  [facility]      Yes appointment please. Ok to use reserved,same day or approval required slots if needed.     Dr. Jorge Connors   5/25/2023 1:26 PM

## 2023-05-25 NOTE — PATIENT INSTRUCTIONS
Right Shoulder injury, some concern for RC tear. We discussed the following treatment options: symptom treatment, activity modification/rest, imaging, rehab and referral. Following discussion, plan: will start with supportive care and Home Exercise Program along with PT referral. Consider MRI pending clinical course.    Right hip pain improving, will continue to monitor.    Plan:  - Today's Plan of Care:  Referral to PT for shoulder - [facility]  Discussed activity considerations and other supportive care including Ice/Heat, OTC and other topical medications as needed.    Monitor hip pain    -We also discussed other future treatment options:  MRI right shoulder  Greater trochanteric bursa injection  MRI right hip    Follow Up: 3-4 weeks    If you have any further questions for your physician or physician s care team you can call [phone] and use option 3 to leave a voice message.

## 2023-06-14 DIAGNOSIS — E78.49 OTHER HYPERLIPIDEMIA: ICD-10-CM

## 2023-06-15 RX ORDER — ATORVASTATIN CALCIUM 10 MG/1
10 TABLET, FILM COATED ORAL DAILY
Qty: 90 TABLET | Refills: 1 | Status: SHIPPED | OUTPATIENT
Start: 2023-06-15 | End: 2023-09-18

## 2023-06-23 ENCOUNTER — OFFICE VISIT (OUTPATIENT)
Dept: FAMILY MEDICINE | Facility: CLINIC | Age: 68
End: 2023-06-23
Payer: MEDICARE

## 2023-06-23 VITALS
BODY MASS INDEX: 34.51 KG/M2 | DIASTOLIC BLOOD PRESSURE: 82 MMHG | TEMPERATURE: 98.7 F | RESPIRATION RATE: 16 BRPM | SYSTOLIC BLOOD PRESSURE: 132 MMHG | HEART RATE: 87 BPM | OXYGEN SATURATION: 97 % | WEIGHT: 214.7 LBS | HEIGHT: 66 IN

## 2023-06-23 DIAGNOSIS — R03.0 ELEVATED BP WITHOUT DIAGNOSIS OF HYPERTENSION: Primary | ICD-10-CM

## 2023-06-23 DIAGNOSIS — E78.49 OTHER HYPERLIPIDEMIA: ICD-10-CM

## 2023-06-23 LAB
ALBUMIN SERPL BCG-MCNC: 4.6 G/DL (ref 3.5–5.2)
ALP SERPL-CCNC: 102 U/L (ref 35–104)
ALT SERPL W P-5'-P-CCNC: 24 U/L (ref 0–50)
ANION GAP SERPL CALCULATED.3IONS-SCNC: 11 MMOL/L (ref 7–15)
AST SERPL W P-5'-P-CCNC: 30 U/L (ref 0–45)
BILIRUB SERPL-MCNC: 0.4 MG/DL
BUN SERPL-MCNC: 18.5 MG/DL (ref 8–23)
CALCIUM SERPL-MCNC: 9.4 MG/DL (ref 8.8–10.2)
CHLORIDE SERPL-SCNC: 106 MMOL/L (ref 98–107)
CHOLEST SERPL-MCNC: 150 MG/DL
CREAT SERPL-MCNC: 1 MG/DL (ref 0.51–0.95)
DEPRECATED HCO3 PLAS-SCNC: 25 MMOL/L (ref 22–29)
GFR SERPL CREATININE-BSD FRML MDRD: 61 ML/MIN/1.73M2
GLUCOSE SERPL-MCNC: 96 MG/DL (ref 70–99)
HDLC SERPL-MCNC: 45 MG/DL
LDLC SERPL CALC-MCNC: 80 MG/DL
NONHDLC SERPL-MCNC: 105 MG/DL
POTASSIUM SERPL-SCNC: 4.6 MMOL/L (ref 3.4–5.3)
PROT SERPL-MCNC: 7.8 G/DL (ref 6.4–8.3)
SODIUM SERPL-SCNC: 142 MMOL/L (ref 136–145)
TRIGL SERPL-MCNC: 127 MG/DL

## 2023-06-23 PROCEDURE — 80061 LIPID PANEL: CPT | Performed by: FAMILY MEDICINE

## 2023-06-23 PROCEDURE — 80053 COMPREHEN METABOLIC PANEL: CPT | Performed by: FAMILY MEDICINE

## 2023-06-23 PROCEDURE — 36415 COLL VENOUS BLD VENIPUNCTURE: CPT | Performed by: FAMILY MEDICINE

## 2023-06-23 PROCEDURE — 99213 OFFICE O/P EST LOW 20 MIN: CPT | Performed by: FAMILY MEDICINE

## 2023-06-23 NOTE — PROGRESS NOTES
"Elevated BP without diagnosis of hypertension  Normal BP today, checked twice.   She will try to monitor blood pressure at home and bring a log with her at her next visit.  Discussed low-salt diet and regular physical activities.  - Comprehensive metabolic panel (BMP + Alb, Alk Phos, ALT, AST, Total. Bili, TP)  - Blood Pressure Monitoring (COMFORT TOUCH BP CUFF/MEDIUM) MISC; 1 Units daily To check home BP daily    Other hyperlipidemia  Tolerating Lipitor.  - Comprehensive metabolic panel (BMP + Alb, Alk Phos, ALT, AST, Total. Bili, TP)  - Lipid panel    Subjective   Alysha is a 68 year old, presenting for the following health issues:  follow up  (BP check )    Has been going to physical therapy for hip pain.  Was told blood pressure was elevated and advised to see PCP.  No known history of hypertension.  Taking only cholesterol medication.        6/23/2023     1:30 PM   Additional Questions   Roomed by darin young   Accompanied by Self     History of Present Illness       Reason for visit:  Blood pressure  Symptom onset:  More than a month    She eats 0-1 servings of fruits and vegetables daily.She consumes 0 sweetened beverage(s) daily.She exercises with enough effort to increase her heart rate 10 to 19 minutes per day.  She exercises with enough effort to increase her heart rate 3 or less days per week.   She is taking medications regularly.     Review of Systems   CONSTITUTIONAL: NEGATIVE for fever, chills, change in weight  CV: NEGATIVE for chest pain/pressure      Objective    /82 (BP Location: Left arm, Patient Position: Sitting, Cuff Size: Adult Regular)   Pulse 87   Temp 98.7  F (37.1  C) (Oral)   Resp 16   Ht 1.664 m (5' 5.5\")   Wt 97.4 kg (214 lb 11.2 oz)   SpO2 97%   BMI 35.18 kg/m    Body mass index is 35.18 kg/m .  Physical Exam   GENERAL: healthy, alert and no distress  NECK: Supple  RESP: lungs clear to auscultation - no rales, rhonchi or wheezes  CV: regular rates and rhythm and no murmur, " click or rub  PSYCH: mentation appears normal, affect normal/bright

## 2023-06-26 ENCOUNTER — OFFICE VISIT (OUTPATIENT)
Dept: ORTHOPEDICS | Facility: CLINIC | Age: 68
End: 2023-06-26
Payer: MEDICARE

## 2023-06-26 VITALS
HEART RATE: 66 BPM | DIASTOLIC BLOOD PRESSURE: 91 MMHG | SYSTOLIC BLOOD PRESSURE: 154 MMHG | WEIGHT: 214 LBS | BODY MASS INDEX: 35.07 KG/M2

## 2023-06-26 DIAGNOSIS — M25.511 ACUTE PAIN OF RIGHT SHOULDER: Primary | ICD-10-CM

## 2023-06-26 DIAGNOSIS — M25.811 IMPINGEMENT OF RIGHT SHOULDER: ICD-10-CM

## 2023-06-26 PROCEDURE — 20610 DRAIN/INJ JOINT/BURSA W/O US: CPT | Mod: RT | Performed by: PEDIATRICS

## 2023-06-26 RX ORDER — LIDOCAINE HYDROCHLORIDE 10 MG/ML
2 INJECTION, SOLUTION INFILTRATION; PERINEURAL
Status: SHIPPED | OUTPATIENT
Start: 2023-06-26

## 2023-06-26 RX ORDER — TRIAMCINOLONE ACETONIDE 40 MG/ML
40 INJECTION, SUSPENSION INTRA-ARTICULAR; INTRAMUSCULAR
Status: SHIPPED | OUTPATIENT
Start: 2023-06-26

## 2023-06-26 RX ADMIN — TRIAMCINOLONE ACETONIDE 40 MG: 40 INJECTION, SUSPENSION INTRA-ARTICULAR; INTRAMUSCULAR at 10:40

## 2023-06-26 RX ADMIN — LIDOCAINE HYDROCHLORIDE 2 ML: 10 INJECTION, SOLUTION INFILTRATION; PERINEURAL at 10:40

## 2023-06-26 NOTE — PROGRESS NOTES
"ASSESSMENT & PLAN    Ayah was seen today for pain, follow up, pain and follow up.    Diagnoses and all orders for this visit:    Acute pain of right shoulder    Impingement of right shoulder    Other orders  -     Large Joint Injection/Arthocentesis: R subacromial bursa      This issue is acute and Unchanged.    We discussed the following treatment options: symptom treatment, activity modification/rest, imaging, rehab and referral. Following discussion, plan:  Given persistent pain, discuss injection and physical therapy first step, close follow up if not improving.    Plan:  - Today's Plan of Care:  Steroid injection of the right shoulder: subacromial space was performed today in clinic  Icing for the next 1-2 days may be helpful for pain. Injection may take 10-14 days to see the full effect.    Discussed activity considerations and other supportive care including Ice/Heat, OTC and other topical medications as needed.    -We also discussed other future treatment options:  MRI of right shoulder    Follow Up: 6 - 8 weeks      Concerning signs and symptoms were reviewed and all questions were answered at this time.    Litzy Nance MD [facility]  Sports Medicine Physician  [facility]      SUBJECTIVE- Interim History June 26, 2023    Chief Complaint   Patient presents with     Right Hip - Pain, Follow Up     Right Shoulder - Pain, Follow Up       Ayah Garcia is a 68 year old female who is seen in f/u up for    Acute pain of right shoulder  Impingement of right shoulder.  Since last visit on 5/25/23, patient has been doing okay. Persistent pain of the right shoulder -  she states that some ROM she can do better, but others have gotten so bad that she needs assistance with bathing and dressing.       Initial History:  Onset: 1 month ago. Reports lifting up a [employer] size laundry detergent   Location of Pain: right shoulder; AC joint, superior  Worsened by: abduction, flexion, \"in between motion\" " of down and up (needs to be supported), dressing, bathing, reaching behind, but can golf as long as her arm is at her side   Better with: assisting her arm up, ibuprofen, icing   Treatments tried: icing, ibuprofen, previous imaging (xray 23 ), HEP   Associated symptoms: weakness, instability, decreased ROM (but improvements in certain areas)       REVIEW OF SYSTEMS:  Review of Systems    OBJECTIVE:  BP (!) 154/91   Pulse 66   Wt 97.1 kg (214 lb)   BMI 35.07 kg/m     General: healthy, alert and in no distress  Skin: no suspicious lesions or rash.  CV: distal perfusion intact  Resp: normal respiratory effort without conversational dyspnea   Psych: normal mood and affect  Gait: NORMAL  Neuro: Normal light sensory exam of upper extremity     Bilateral Shoulder exam  Inspection and Posture:       rounded shoulders and upper back     Skin:        no visible deformities     Tender:        None currently     Non Tender:       remainder of shoulder bilateral     ROM:        forward flexion 160  right       abduction 160 right       internal rotation gluteal region right       external rotation 45 right     Painful motions:       flexion right       elevation right     Strength:        abduction 5/5 bilateral       flexion 4/5 bilateral       internal rotation 5/5 bilateral       external rotation 5/5 bilateral     Impingement testing:       neg (-) Neer right       positive (+) Be right     Sensation:        normal sensation over shoulder and upper extremity     RADIOLOGY:  Final results and radiologist's interpretation, available in the [facility] health record.  Images were reviewed with the patient in the office today.  My personal interpretation of the performed imagin XR views of right shoulder reviewed: no acute bony abnormality, mild AC joint degenerative change  - will follow official read      Review of the result(s) of each unique test - XR          Large Joint Injection/Arthocentesis: R subacromial  bursa    Date/Time: 6/26/2023 10:40 AM    Performed by: Litzy Nance MD  Authorized by: Litzy Nance MD    Indications:  Pain  Needle Size:  25 G  Guidance: landmark guided    Approach:  Posterior  Location:  Shoulder      Site:  R subacromial bursa  Medications:  2 mL lidocaine 1 %; 40 mg triamcinolone 40 MG/ML  Outcome:  Tolerated well, no immediate complications  Procedure discussed: discussed risks, benefits, and alternatives    Consent Given by:  Patient  Timeout: timeout called immediately prior to procedure    Prep: patient was prepped and draped in usual sterile fashion     The risks, benefits and complications of steroid injection were discussed with the patient (including but not limited to: bleeding, infection, pain, scar, damage to adjacent structures, atrophy or necrosis of soft tissue, skin blanching, failure to relieve symptoms, worsening of symptoms, allergic reaction). After this discussion all questions were addressed and answered and the patient elected to proceed. The patient tolerated the procedure well without complications.  Also discussed that if diabetic, recommend close monitoring of blood sugars over the next week as cortisone injections can temporarily elevate blood sugars.

## 2023-06-26 NOTE — LETTER
6/26/2023         RE: Ayah Gacria  [address]  [place] [address]        Dear Colleague,    Thank you for referring your patient, Ayah Garcia, to the [facility]. Please see a copy of my visit note below.    ASSESSMENT & PLAN    Ayah was seen today for pain, follow up, pain and follow up.    Diagnoses and all orders for this visit:    Acute pain of right shoulder    Impingement of right shoulder    Other orders  -     Large Joint Injection/Arthocentesis: R subacromial bursa      This issue is acute and Unchanged.    We discussed the following treatment options: symptom treatment, activity modification/rest, imaging, rehab and referral. Following discussion, plan:  Given persistent pain, discuss injection and physical therapy first step, close follow up if not improving.    Plan:  - Today's Plan of Care:  Steroid injection of the right shoulder: subacromial space was performed today in clinic  Icing for the next 1-2 days may be helpful for pain. Injection may take 10-14 days to see the full effect.    Discussed activity considerations and other supportive care including Ice/Heat, OTC and other topical medications as needed.    -We also discussed other future treatment options:  MRI of right shoulder    Follow Up: 6 - 8 weeks      Concerning signs and symptoms were reviewed and all questions were answered at this time.    Litzy Nance MD [facility]  Sports Medicine Physician  [facility]      SUBJECTIVE- Interim History June 26, 2023    Chief Complaint   Patient presents with     Right Hip - Pain, Follow Up     Right Shoulder - Pain, Follow Up       Ayah Garcia is a 68 year old female who is seen in f/u up for    Acute pain of right shoulder  Impingement of right shoulder.  Since last visit on 5/25/23, patient has been doing okay. Persistent pain of the right shoulder -  she states that some ROM she can do better, but  "others have gotten so bad that she needs assistance with bathing and dressing.       Initial History:  Onset: 1 month ago. Reports lifting up a [employer] size laundry detergent   Location of Pain: right shoulder; AC joint, superior  Worsened by: abduction, flexion, \"in between motion\" of down and up (needs to be supported), dressing, bathing, reaching behind, but can golf as long as her arm is at her side   Better with: assisting her arm up, ibuprofen, icing   Treatments tried: icing, ibuprofen, previous imaging (xray 5/25/23 ), HEP   Associated symptoms: weakness, instability, decreased ROM (but improvements in certain areas)       REVIEW OF SYSTEMS:  Review of Systems    OBJECTIVE:  BP (!) 154/91   Pulse 66   Wt 97.1 kg (214 lb)   BMI 35.07 kg/m     General: healthy, alert and in no distress  Skin: no suspicious lesions or rash.  CV: distal perfusion intact  Resp: normal respiratory effort without conversational dyspnea   Psych: normal mood and affect  Gait: NORMAL  Neuro: Normal light sensory exam of upper extremity     Bilateral Shoulder exam  Inspection and Posture:       rounded shoulders and upper back     Skin:        no visible deformities     Tender:        None currently     Non Tender:       remainder of shoulder bilateral     ROM:        forward flexion 160  right       abduction 160 right       internal rotation gluteal region right       external rotation 45 right     Painful motions:       flexion right       elevation right     Strength:        abduction 5/5 bilateral       flexion 4/5 bilateral       internal rotation 5/5 bilateral       external rotation 5/5 bilateral     Impingement testing:       neg (-) Neer right       positive (+) Be right     Sensation:        normal sensation over shoulder and upper extremity     RADIOLOGY:  Final results and radiologist's interpretation, available in the [facility] health record.  Images were reviewed with the patient in the office today.  My personal " interpretation of the performed imagin XR views of right shoulder reviewed: no acute bony abnormality, mild AC joint degenerative change  - will follow official read      Review of the result(s) of each unique test - XR          Large Joint Injection/Arthocentesis: R subacromial bursa    Date/Time: 2023 10:40 AM    Performed by: Litzy Nance MD  Authorized by: Litzy Nance MD    Indications:  Pain  Needle Size:  25 G  Guidance: landmark guided    Approach:  Posterior  Location:  Shoulder      Site:  R subacromial bursa  Medications:  2 mL lidocaine 1 %; 40 mg triamcinolone 40 MG/ML  Outcome:  Tolerated well, no immediate complications  Procedure discussed: discussed risks, benefits, and alternatives    Consent Given by:  Patient  Timeout: timeout called immediately prior to procedure    Prep: patient was prepped and draped in usual sterile fashion     The risks, benefits and complications of steroid injection were discussed with the patient (including but not limited to: bleeding, infection, pain, scar, damage to adjacent structures, atrophy or necrosis of soft tissue, skin blanching, failure to relieve symptoms, worsening of symptoms, allergic reaction). After this discussion all questions were addressed and answered and the patient elected to proceed. The patient tolerated the procedure well without complications.  Also discussed that if diabetic, recommend close monitoring of blood sugars over the next week as cortisone injections can temporarily elevate blood sugars.            Again, thank you for allowing me to participate in the care of your patient.        Sincerely,        Litzy Nance MD

## 2023-06-26 NOTE — PATIENT INSTRUCTIONS
We discussed the following treatment options: symptom treatment, activity modification/rest, imaging, rehab and referral. Following discussion, plan:  Given persistent pain, discuss injection and physical therapy first step, close follow up if not improving.    Plan:  - Today's Plan of Care:  Steroid injection of the right shoulder: subacromial space was performed today in clinic  Icing for the next 1-2 days may be helpful for pain. Injection may take 10-14 days to see the full effect.    Discussed activity considerations and other supportive care including Ice/Heat, OTC and other topical medications as needed.    -We also discussed other future treatment options:  MRI of right shoulder    Follow Up: 6 - 8 weeks    If you have any further questions for your physician or physician s care team you can call [phone] and use option 3 to leave a voice message.     After the Injection     After the injection, strenuous and repetitive activity should be minimized for approximately 48 hours.   Ice should be applied to the injected area at least for the next 48 hours.   Apply ice to the injected area at least 3 - 4 times a day for 20 minutes each time for the next 48 hours. This can reduce the painful  flare  reaction that can follow an injection the next day. This reaction can cause the area that was injected to hurt more the next day just from the injection. This will resolve within a day if it does occur.     Use over-the-counter pain medications such as Tylenol to help with the pain if necessary.     After 48 hours, icing the area may be continued if you find it beneficial.     The lidocaine or marcaine (commonly called Novocain) is an anesthetic agent that is injected with the steroid will typically relieve your pain for a few hours following the injection. If the  Novocain  and steroid are injected near a nerve, you may experience local numbness or weakness from the nerve block until it wears off. After this wears  off your pain may return until the steroid takes effect.   The steroid may be effective immediately after the injection. Do not be concerned if the injection is not effective in relieving your symptoms immediately. In some cases, it may take up to two weeks for the steroid to work.   If you are diabetic, the corticosteroid may cause your blood sugar to become elevated for several days following the injection. This response usually lasts about 2-4 days before it returns to your normal level.   You should report any adverse reaction to you doctor. Call if there are any questions.

## 2023-06-27 ENCOUNTER — TELEPHONE (OUTPATIENT)
Dept: FAMILY MEDICINE | Facility: CLINIC | Age: 68
End: 2023-06-27

## 2023-06-27 ENCOUNTER — DOCUMENTATION ONLY (OUTPATIENT)
Dept: FAMILY MEDICINE | Facility: CLINIC | Age: 68
End: 2023-06-27

## 2023-06-27 ENCOUNTER — ALLIED HEALTH/NURSE VISIT (OUTPATIENT)
Dept: FAMILY MEDICINE | Facility: CLINIC | Age: 68
End: 2023-06-27
Payer: MEDICARE

## 2023-06-27 VITALS — DIASTOLIC BLOOD PRESSURE: 75 MMHG | SYSTOLIC BLOOD PRESSURE: 147 MMHG

## 2023-06-27 DIAGNOSIS — R94.4 ABNORMAL RENAL FUNCTION TEST: Primary | ICD-10-CM

## 2023-06-27 DIAGNOSIS — Z01.30 BP CHECK: Primary | ICD-10-CM

## 2023-06-27 PROCEDURE — 99207 PR NO CHARGE NURSE ONLY: CPT

## 2023-06-27 NOTE — PROGRESS NOTES
I met with Ayah Garcia at the request of Dory to recheck her blood pressure.  Blood pressure medications on the med list were reviewed with patient.    Patient has taken all medications as per usual regimen: Yes. Have not started on medication for Blood pressure  Patient reports tolerating them without any issues or concerns: No    There were no vitals filed for this visit.    After 5 minutes, the patient's blood pressure remained greater than or equal to 140/90.    Is the patient currently having any chest pain? No  Does the patient currently have a headache? Yes but possible due to weather  Does the patient currently have any vision changes? No  Does the patient currently have any nausea? No  Does the patient currently have any abdominal pain? No    Patient was instructed to hear from office possible new recommendation from doctor..

## 2023-06-27 NOTE — TELEPHONE ENCOUNTER
----- Message from Jorge Connors MD sent at 6/27/2023  8:33 AM CDT -----  Kidney function is slightly off.  No intervention needed at this time but would like to repeat in 2 weeks.  Future lab order placed.  Please call the patient.  Please advise to increase daily water intake.      Dr. Jorge Connors  6/27/2023 8:32 AM

## 2023-06-27 NOTE — TELEPHONE ENCOUNTER
Called patient on the phone to relay provider test result and recommendation below.  Patient is requesting to come into the clinic to have the nurse show her how to properly use her automatic blood pressure cuff.  It was uncertain if she is doing it correctly as it has been given her high reading.  Patient denied severe headache, vision changes, chest pain/shortness of breath or fatigue.  Lab only and nurse only BP check scheduled with dates, times and locations confirmed with patient.    SALMA Martinez, RN  [facility]    Jorge Connors MD  P [facility]/Ob Support Pool  Kidney function is slightly off.  No intervention needed at this time but would like to repeat in 2 weeks.  Future lab order placed.  Please call the patient.  Please advise to increase daily water intake.       Dr. Jorge Connors   6/27/2023 8:32 AM

## 2023-06-27 NOTE — PROGRESS NOTES
The patient was here earlier for blood pressure check.  Blood pressure was above target at 147/75 and 140/90.  Home blood pressure readings are also elevated per RN who saw the patient today.  I called the patient to discuss options including low-dose blood pressure medication.  No answer, left message.  I will try to call again later.

## 2023-07-05 ENCOUNTER — TRANSFERRED RECORDS (OUTPATIENT)
Dept: HEALTH INFORMATION MANAGEMENT | Facility: CLINIC | Age: 68
End: 2023-07-05
Payer: MEDICARE

## 2023-07-05 ENCOUNTER — MYC MEDICAL ADVICE (OUTPATIENT)
Dept: FAMILY MEDICINE | Facility: CLINIC | Age: 68
End: 2023-07-05
Payer: MEDICARE

## 2023-07-06 DIAGNOSIS — I10 ESSENTIAL HYPERTENSION: Primary | ICD-10-CM

## 2023-07-06 RX ORDER — LISINOPRIL 5 MG/1
5 TABLET ORAL DAILY
Qty: 90 TABLET | Refills: 1 | Status: SHIPPED | OUTPATIENT
Start: 2023-07-06 | End: 2023-10-24

## 2023-07-06 NOTE — TELEPHONE ENCOUNTER
Patient respond back via [employer] message and willing to start on low dose of medication to treat elevated BP.    Jin       I received your message regarding my blood pressure and if I should go on medication.  I m going to let you make that call.  I m willing to start at the lowest dose, work on losing weight and getting more exercise.   Alysha

## 2023-07-10 ENCOUNTER — LAB (OUTPATIENT)
Dept: LAB | Facility: CLINIC | Age: 68
End: 2023-07-10
Payer: MEDICARE

## 2023-07-10 DIAGNOSIS — R94.4 ABNORMAL RENAL FUNCTION TEST: ICD-10-CM

## 2023-07-10 LAB
CREAT SERPL-MCNC: 0.95 MG/DL (ref 0.51–0.95)
GFR SERPL CREATININE-BSD FRML MDRD: 65 ML/MIN/1.73M2

## 2023-07-10 PROCEDURE — 82565 ASSAY OF CREATININE: CPT

## 2023-07-10 PROCEDURE — 36415 COLL VENOUS BLD VENIPUNCTURE: CPT

## 2023-08-07 ENCOUNTER — OFFICE VISIT (OUTPATIENT)
Dept: ORTHOPEDICS | Facility: CLINIC | Age: 68
End: 2023-08-07
Payer: MEDICARE

## 2023-08-07 VITALS
WEIGHT: 214 LBS | BODY MASS INDEX: 35.07 KG/M2 | DIASTOLIC BLOOD PRESSURE: 83 MMHG | SYSTOLIC BLOOD PRESSURE: 137 MMHG | HEART RATE: 75 BPM

## 2023-08-07 DIAGNOSIS — M25.511 ACUTE PAIN OF RIGHT SHOULDER: Primary | ICD-10-CM

## 2023-08-07 DIAGNOSIS — M25.811 IMPINGEMENT OF RIGHT SHOULDER: ICD-10-CM

## 2023-08-07 PROCEDURE — 99213 OFFICE O/P EST LOW 20 MIN: CPT | Performed by: PEDIATRICS

## 2023-08-07 NOTE — LETTER
8/7/2023         RE: Ayah Garcia  [address]  [place] [address]        Dear Colleague,    Thank you for referring your patient, Ayah Garcia, to the [facility]. Please see a copy of my visit note below.    ASSESSMENT & PLAN    Ayah was seen today for pain and follow up.    Diagnoses and all orders for this visit:    Acute pain of right shoulder  -     MR Shoulder Right w/o Contrast; Future    Impingement of right shoulder  -     MR Shoulder Right w/o Contrast; Future      This issue is chronic and Unchanged.    ICD-10-CM    1. Acute pain of right shoulder  M25.511 MR Shoulder Right w/o Contrast      2. Impingement of right shoulder  M25.811 MR Shoulder Right w/o Contrast        Patient Instructions   We discussed these other possible diagnosis: persistent shoulder pain despite PT and injections will obtain MRI to evaluate.    Plan:  - Today's Plan of Care:  MRI of the Right Shoulder - Call [phone] to schedule MRI   Discussed activity considerations and other supportive care including Ice/Heat, OTC and other topical medications as needed.  Continue PT and Home Exercise Program    -We also discussed other future treatment options:  Referral to Orthopedic Surgery  US guided injection    Follow Up: to be determined, will send results via iGlue  Concerning signs and symptoms were reviewed and all questions were answered at this time.    Litzy Nance MD [facility]  Sports Medicine Physician  [facility]    SUBJECTIVE- Interim History August 7, 2023    Chief Complaint   Patient presents with     Right Shoulder - Pain, Follow Up       Ayah Garcia is a 68 year old female who is seen in f/u up for    Acute pain of right shoulder  Impingement of right shoulder.  Since last visit on 6/26/23 patient has persistent pain. She was doing really well. Physical therapy was going great.  About 2 weeks ago things went downhill. She  "is still not able to put her arm behind her back.  She was instructed by the physical therapy to come back and follow up. Alysha is looking for next steps.    - Now ~ 3 months from initial onset    Worsened by: abduction, flexion, \"in between motion\" of down and up (needs to be supported), dressing, bathing, reaching behind, but can golf as long as her arm is at her side, gallon of milk, above shoulders  Better with: assisting her arm up, ibuprofen, icing, physical therapy, corticosteroid injection   Treatments tried: icing, ibuprofen, previous imaging (xray 5/25/23 ), physical therapy (6 visits) that have been helpful, HEP, corticosteroid injection on 6/26/23 that provided good relief until 2 weeks ago.    Associated symptoms: weakness, instability, decreased ROM (but improvements in certain areas)    REVIEW OF SYSTEMS:  Review of Systems    OBJECTIVE:  /83   Pulse 75   Wt 97.1 kg (214 lb)   BMI 35.07 kg/m     General: healthy, alert and in no distress  Skin: no suspicious lesions or rash.  CV: distal perfusion intact  Resp: normal respiratory effort without conversational dyspnea   Psych: normal mood and affect  Gait: NORMAL  Neuro: Normal light sensory exam of upper extremity     Bilateral Shoulder exam  Inspection and Posture:       rounded shoulders and upper back     Skin:        no visible deformities     Tender:        None currently     Non Tender:       remainder of shoulder bilateral     ROM:        forward flexion 170  right       abduction 170 right       internal rotation gluteal region right       external rotation 45 right     Painful motions: internal rotation     Strength:        abduction 5/5 bilateral       flexion 5/5 bilateral       internal rotation 5/5 bilateral       external rotation 4/5 bilateral     Impingement testing:       neg (-) Neer right       positive (+) Be right     Sensation:        normal sensation over shoulder and upper extremity     RADIOLOGY:  Final results " and radiologist's interpretation, available in the [facility] health record.  Images were reviewed with the patient in the office today.  My personal interpretation of the performed imagin XR views of right shoulder reviewed 2023: no acute bony abnormality, mild AC joint degenerative change     Review of the result(s) of each unique test - XR             Again, thank you for allowing me to participate in the care of your patient.        Sincerely,        Litzy Nance MD

## 2023-08-07 NOTE — PATIENT INSTRUCTIONS
We discussed these other possible diagnosis: persistent shoulder pain despite PT and injections will obtain MRI to evaluate.    Plan:  - Today's Plan of Care:  MRI of the Right Shoulder - Call [phone] to schedule MRI   Discussed activity considerations and other supportive care including Ice/Heat, OTC and other topical medications as needed.  Continue PT and Home Exercise Program    -We also discussed other future treatment options:  Referral to Orthopedic Surgery  US guided injection    Follow Up: to be determined, will send results via Pongo Resume    If you have any further questions for your physician or physician s care team you can call [phone] and use option 3 to leave a voice message.

## 2023-08-07 NOTE — PROGRESS NOTES
"ASSESSMENT & PLAN    Ayah was seen today for pain and follow up.    Diagnoses and all orders for this visit:    Acute pain of right shoulder  -     MR Shoulder Right w/o Contrast; Future    Impingement of right shoulder  -     MR Shoulder Right w/o Contrast; Future      This issue is chronic and Unchanged.    ICD-10-CM    1. Acute pain of right shoulder  M25.511 MR Shoulder Right w/o Contrast      2. Impingement of right shoulder  M25.811 MR Shoulder Right w/o Contrast        Patient Instructions   We discussed these other possible diagnosis: persistent shoulder pain despite PT and injections will obtain MRI to evaluate.    Plan:  - Today's Plan of Care:  MRI of the Right Shoulder - Call [phone] to schedule MRI   Discussed activity considerations and other supportive care including Ice/Heat, OTC and other topical medications as needed.  Continue PT and Home Exercise Program    -We also discussed other future treatment options:  Referral to Orthopedic Surgery  US guided injection    Follow Up: to be determined, will send results via KangaDot  Concerning signs and symptoms were reviewed and all questions were answered at this time.    Litzy Nance MD [facility]  Sports Medicine Physician  [facility]    SUBJECTIVE- Interim History August 7, 2023    Chief Complaint   Patient presents with    Right Shoulder - Pain, Follow Up       Ayah Garcia is a 68 year old female who is seen in f/u up for    Acute pain of right shoulder  Impingement of right shoulder.  Since last visit on 6/26/23 patient has persistent pain. She was doing really well. Physical therapy was going great.  About 2 weeks ago things went downhill. She is still not able to put her arm behind her back.  She was instructed by the physical therapy to come back and follow up. Alysha is looking for next steps.    - Now ~ 3 months from initial onset    Worsened by: abduction, flexion, \"in between motion\" of down and up (needs to be " supported), dressing, bathing, reaching behind, but can golf as long as her arm is at her side, gallon of milk, above shoulders  Better with: assisting her arm up, ibuprofen, icing, physical therapy, corticosteroid injection   Treatments tried: icing, ibuprofen, previous imaging (xray 23 ), physical therapy (6 visits) that have been helpful, HEP, corticosteroid injection on 23 that provided good relief until 2 weeks ago.    Associated symptoms: weakness, instability, decreased ROM (but improvements in certain areas)    REVIEW OF SYSTEMS:  Review of Systems    OBJECTIVE:  /83   Pulse 75   Wt 97.1 kg (214 lb)   BMI 35.07 kg/m     General: healthy, alert and in no distress  Skin: no suspicious lesions or rash.  CV: distal perfusion intact  Resp: normal respiratory effort without conversational dyspnea   Psych: normal mood and affect  Gait: NORMAL  Neuro: Normal light sensory exam of upper extremity     Bilateral Shoulder exam  Inspection and Posture:       rounded shoulders and upper back     Skin:        no visible deformities     Tender:        None currently     Non Tender:       remainder of shoulder bilateral     ROM:        forward flexion 170  right       abduction 170 right       internal rotation gluteal region right       external rotation 45 right     Painful motions: internal rotation     Strength:        abduction 5/5 bilateral       flexion 5/5 bilateral       internal rotation 5/5 bilateral       external rotation 4/5 bilateral     Impingement testing:       neg (-) Neer right       positive (+) Be right     Sensation:        normal sensation over shoulder and upper extremity     RADIOLOGY:  Final results and radiologist's interpretation, available in the [facility] health record.  Images were reviewed with the patient in the office today.  My personal interpretation of the performed imagin XR views of right shoulder reviewed 2023: no acute bony abnormality, mild AC joint  degenerative change     Review of the result(s) of each unique test - XR

## 2023-08-16 ENCOUNTER — HOSPITAL ENCOUNTER (OUTPATIENT)
Dept: MRI IMAGING | Facility: HOSPITAL | Age: 68
Discharge: HOME | End: 2023-08-16
Attending: PEDIATRICS | Admitting: PEDIATRICS
Payer: MEDICARE

## 2023-08-16 DIAGNOSIS — M25.811 IMPINGEMENT OF RIGHT SHOULDER: ICD-10-CM

## 2023-08-16 DIAGNOSIS — M25.511 ACUTE PAIN OF RIGHT SHOULDER: ICD-10-CM

## 2023-08-16 PROCEDURE — G1010 CDSM STANSON: HCPCS

## 2023-08-17 NOTE — RESULT ENCOUNTER NOTE
Reviewed and notified of results via [employer].  Damon Encarnacion,  Please see the results of your MRI.  Given the rotator cuff tear seen, we could consider referral to orthopedic surgery. Let us know if you'd like this referral, otherwise, I would recommend scheduling a follow up appointment to review these results in more detail. Let us know if you have questions.    Litzy Nance MD, CAQ  Primary Care Sports Medicine  [facility]

## 2023-09-16 ENCOUNTER — MYC MEDICAL ADVICE (OUTPATIENT)
Dept: FAMILY MEDICINE | Facility: CLINIC | Age: 68
End: 2023-09-16
Payer: MEDICARE

## 2023-09-16 DIAGNOSIS — E78.49 OTHER HYPERLIPIDEMIA: ICD-10-CM

## 2023-09-18 RX ORDER — ATORVASTATIN CALCIUM 10 MG/1
10 TABLET, FILM COATED ORAL DAILY
Qty: 90 TABLET | Refills: 0 | Status: SHIPPED | OUTPATIENT
Start: 2023-09-18 | End: 2023-10-24

## 2023-10-16 ENCOUNTER — ANCILLARY PROCEDURE (OUTPATIENT)
Dept: MAMMOGRAPHY | Facility: CLINIC | Age: 68
End: 2023-10-16
Attending: FAMILY MEDICINE
Payer: MEDICARE

## 2023-10-16 DIAGNOSIS — R92.1 BREAST CALCIFICATIONS: ICD-10-CM

## 2023-10-16 PROCEDURE — 77066 DX MAMMO INCL CAD BI: CPT

## 2023-10-16 PROCEDURE — 77062 BREAST TOMOSYNTHESIS BI: CPT

## 2023-10-20 ENCOUNTER — MYC MEDICAL ADVICE (OUTPATIENT)
Dept: FAMILY MEDICINE | Facility: CLINIC | Age: 68
End: 2023-10-20
Payer: MEDICARE

## 2023-10-23 ASSESSMENT — ENCOUNTER SYMPTOMS
CHILLS: 0
DYSURIA: 0
HEMATURIA: 0
NAUSEA: 0
SHORTNESS OF BREATH: 0
ARTHRALGIAS: 0
HEADACHES: 0
MYALGIAS: 1
FREQUENCY: 0
HEMATOCHEZIA: 0
SORE THROAT: 0
BREAST MASS: 0
ABDOMINAL PAIN: 0
CONSTIPATION: 0
DIZZINESS: 0
WEAKNESS: 0
EYE PAIN: 0
NERVOUS/ANXIOUS: 0
FEVER: 0
HEARTBURN: 0
JOINT SWELLING: 0
COUGH: 0
DIARRHEA: 0
PARESTHESIAS: 0
PALPITATIONS: 0

## 2023-10-23 ASSESSMENT — ACTIVITIES OF DAILY LIVING (ADL): CURRENT_FUNCTION: NO ASSISTANCE NEEDED

## 2023-10-24 ENCOUNTER — OFFICE VISIT (OUTPATIENT)
Dept: FAMILY MEDICINE | Facility: CLINIC | Age: 68
End: 2023-10-24
Payer: MEDICARE

## 2023-10-24 VITALS
HEIGHT: 66 IN | SYSTOLIC BLOOD PRESSURE: 131 MMHG | BODY MASS INDEX: 33.78 KG/M2 | OXYGEN SATURATION: 97 % | WEIGHT: 210.2 LBS | TEMPERATURE: 98.7 F | RESPIRATION RATE: 16 BRPM | HEART RATE: 70 BPM | DIASTOLIC BLOOD PRESSURE: 85 MMHG

## 2023-10-24 DIAGNOSIS — I10 ESSENTIAL HYPERTENSION: ICD-10-CM

## 2023-10-24 DIAGNOSIS — Z87.891 SMOKING HISTORY: ICD-10-CM

## 2023-10-24 DIAGNOSIS — E78.49 OTHER HYPERLIPIDEMIA: ICD-10-CM

## 2023-10-24 DIAGNOSIS — Z00.00 ANNUAL PHYSICAL EXAM: Primary | ICD-10-CM

## 2023-10-24 LAB
CHOLEST SERPL-MCNC: 152 MG/DL
HDLC SERPL-MCNC: 40 MG/DL
LDLC SERPL CALC-MCNC: 88 MG/DL
NONHDLC SERPL-MCNC: 112 MG/DL
TRIGL SERPL-MCNC: 122 MG/DL

## 2023-10-24 PROCEDURE — 99214 OFFICE O/P EST MOD 30 MIN: CPT | Mod: 25 | Performed by: FAMILY MEDICINE

## 2023-10-24 PROCEDURE — G0439 PPPS, SUBSEQ VISIT: HCPCS | Performed by: FAMILY MEDICINE

## 2023-10-24 PROCEDURE — 80061 LIPID PANEL: CPT | Performed by: FAMILY MEDICINE

## 2023-10-24 PROCEDURE — 36415 COLL VENOUS BLD VENIPUNCTURE: CPT | Performed by: FAMILY MEDICINE

## 2023-10-24 RX ORDER — ATORVASTATIN CALCIUM 10 MG/1
10 TABLET, FILM COATED ORAL DAILY
Qty: 90 TABLET | Refills: 3 | Status: SHIPPED | OUTPATIENT
Start: 2023-10-24

## 2023-10-24 RX ORDER — LISINOPRIL 5 MG/1
5 TABLET ORAL DAILY
Qty: 90 TABLET | Refills: 3 | Status: SHIPPED | OUTPATIENT
Start: 2023-10-24

## 2023-10-24 ASSESSMENT — ACTIVITIES OF DAILY LIVING (ADL): CURRENT_FUNCTION: NO ASSISTANCE NEEDED

## 2023-10-24 NOTE — PROGRESS NOTES
"SUBJECTIVE:   Alysha is a 68 year old who presents for Preventive Visit.      10/24/2023    10:54 AM   Additional Questions   Roomed by Ricarda BARRON       Are you in the first 12 months of your Medicare coverage?  No  Here for physical. No new concerns since last visit.   Healthy Habits:     In general, how would you rate your overall health?  Good    Frequency of exercise:  1 day/week    Duration of exercise:  15-30 minutes    Do you usually eat at least 4 servings of fruit and vegetables a day, include whole grains    & fiber and avoid regularly eating high fat or \"junk\" foods?  No    Taking medications regularly:  Yes    Medication side effects:  Muscle aches    Ability to successfully perform activities of daily living:  No assistance needed    Home Safety:  No safety concerns identified    Hearing Impairment:  Difficulty following a conversation in a noisy restaurant or crowded room    In the past 6 months, have you been bothered by leaking of urine?  No    In general, how would you rate your overall mental or emotional health?  Excellent    Additional concerns today:  Yes      Today's PHQ-2 Score:       10/23/2023    11:54 AM   PHQ-2 ( 1999 [employer])   Q1: Little interest or pleasure in doing things 0   Q2: Feeling down, depressed or hopeless 0   PHQ-2 Score 0   Q1: Little interest or pleasure in doing things Not at all   Q2: Feeling down, depressed or hopeless Not at all   PHQ-2 Score 0       Have you ever done Advance Care Planning? (For example, a Health Directive, POLST, or a discussion with a medical provider or your loved ones about your wishes):      Fall risk  Fallen 2 or more times in the past year?: No  Any fall with injury in the past year?: No    Reviewed and updated as needed this visit by clinical staff   Tobacco  Allergies  Meds              Reviewed and updated as needed this visit by Provider                 Social History     Tobacco Use    Smoking status: Former     Packs/day: 1.50     Years: " 46.00     Additional pack years: 0.00     Total pack years: 69.00     Types: Cigarettes     Start date: 1969     Quit date: 2015     Years since quittin.6     Passive exposure: Past    Smokeless tobacco: Never   Substance Use Topics    Alcohol use: Yes     Comment: Rarely consume alcohol             10/23/2023    11:54 AM   Alcohol Use   Prescreen: >3 drinks/day or >7 drinks/week? No     Do you have a current opioid prescription? No  Do you use any other controlled substances or medications that are not prescribed by a provider? None    Current providers sharing in care for this patient include:   Patient Care Team:  Jorge Connors MD as PCP - General (Family Medicine)  Anibal Arriaza MD as Assigned OBGYN Provider  Jorge Connors MD as Assigned PCP  Litzy Nance MD as Assigned Musculoskeletal Provider    The following health maintenance items are reviewed in Epic and correct as of today:  Health Maintenance   Topic Date Due    COVID-19 Vaccine ( season) 2023    MEDICARE ANNUAL WELLNESS VISIT  10/04/2023    LUNG CANCER SCREENING  2023    ANNUAL REVIEW OF HM ORDERS  2024    MAMMO SCREENING  10/16/2024    FALL RISK ASSESSMENT  10/24/2024    COLORECTAL CANCER SCREENING  2026    ADVANCE CARE PLANNING  10/04/2027    LIPID  2028    DTAP/TDAP/TD IMMUNIZATION (3 - Td or Tdap) 2028    DEXA  2036    HEPATITIS C SCREENING  Completed    PHQ-2 (once per calendar year)  Completed    INFLUENZA VACCINE  Completed    Pneumococcal Vaccine: 65+ Years  Completed    ZOSTER IMMUNIZATION  Completed    RSV VACCINE 60+  Completed    IPV IMMUNIZATION  Aged Out    HPV IMMUNIZATION  Aged Out    MENINGITIS IMMUNIZATION  Aged Out         FHS-7:       2021     8:58 AM 2021     9:20 AM 10/2/2022     8:05 AM 10/4/2022    11:31 AM 10/16/2023     9:43 AM 10/23/2023    11:56 AM   Breast CA Risk Assessment (FHS-7)   Did any of your first-degree relatives have breast or  "ovarian cancer? Yes Yes Yes Yes No Yes   Did any of your relatives have bilateral breast cancer? No No No No No No   Did any man in your family have breast cancer? No No No No No No   Did any woman in your family have breast and ovarian cancer? No No No No No No   Did any woman in your family have breast cancer before age 50 y? No No No No No No   Do you have 2 or more relatives with breast and/or ovarian cancer? No Yes No Yes No No   Do you have 2 or more relatives with breast and/or bowel cancer? No No No No No No     Pertinent mammograms are reviewed under the imaging tab.    CONSTITUTIONAL: NEGATIVE for fever, chills, change in weight  RESP: NEGATIVE for significant cough or SOB  CV: NEGATIVE for chest pain/chest pressure    OBJECTIVE:   /85   Pulse 70   Temp 98.7  F (37.1  C) (Oral)   Resp 16   Ht 1.664 m (5' 5.5\")   Wt 95.3 kg (210 lb 3.2 oz)   SpO2 97%   BMI 34.45 kg/m   Estimated body mass index is 34.45 kg/m  as calculated from the following:    Height as of this encounter: 1.664 m (5' 5.5\").    Weight as of this encounter: 95.3 kg (210 lb 3.2 oz).  Physical Exam  GENERAL: healthy, alert and no distress  NECK: Supple  RESP: lungs clear to auscultation - no rales, rhonchi or wheezes  CV: regular rates and rhythm and no murmur, click or rub  ABDOMEN: soft, nontender, no hepatosplenomegaly, no masses and bowel sounds normal  PSYCH: mentation appears normal, affect normal/bright        ASSESSMENT / PLAN:   Annual physical exam    Smoking history  Quit in 2015.  Requested low dose CT for lungs cancer screening.   - CT Chest Lung Cancer Scrn Low Dose wo; Future    Other hyperlipidemia  - Lipid panel  - atorvastatin (LIPITOR) 10 MG tablet; Take 1 tablet (10 mg) by mouth daily    Essential hypertension  - lisinopril (ZESTRIL) 5 MG tablet; Take 1 tablet (5 mg) by mouth daily         BMI:   Estimated body mass index is 34.45 kg/m  as calculated from the following:    Height as of this encounter: 1.664 m " "(5' 5.5\").    Weight as of this encounter: 95.3 kg (210 lb 3.2 oz).       She reports that she quit smoking about 8 years ago. Her smoking use included cigarettes. She started smoking about 54 years ago. She has a 69.00 pack-year smoking history. She has been exposed to tobacco smoke. She has never used smokeless tobacco.          Jorge Connors MD  [facility]        "

## 2023-10-31 DIAGNOSIS — I10 ESSENTIAL HYPERTENSION: Primary | ICD-10-CM

## 2023-10-31 RX ORDER — AMLODIPINE BESYLATE 5 MG/1
5 TABLET ORAL DAILY
Qty: 90 TABLET | Refills: 0 | Status: SHIPPED | OUTPATIENT
Start: 2023-10-31 | End: 2023-12-06

## 2023-11-16 ENCOUNTER — HOSPITAL ENCOUNTER (OUTPATIENT)
Dept: CT IMAGING | Facility: HOSPITAL | Age: 68
Discharge: HOME | End: 2023-11-16
Attending: FAMILY MEDICINE | Admitting: FAMILY MEDICINE
Payer: MEDICARE

## 2023-11-16 DIAGNOSIS — Z87.891 SMOKING HISTORY: ICD-10-CM

## 2023-11-16 PROCEDURE — 71271 CT THORAX LUNG CANCER SCR C-: CPT

## 2024-03-25 ENCOUNTER — MYC MEDICAL ADVICE (OUTPATIENT)
Dept: FAMILY MEDICINE | Facility: CLINIC | Age: 69
End: 2024-03-25
Payer: MEDICARE

## 2024-03-29 ENCOUNTER — TELEPHONE (OUTPATIENT)
Dept: FAMILY MEDICINE | Facility: CLINIC | Age: 69
End: 2024-03-29
Payer: MEDICARE

## 2024-03-29 DIAGNOSIS — R92.1 BREAST CALCIFICATION SEEN ON MAMMOGRAM: ICD-10-CM

## 2024-03-29 DIAGNOSIS — Z12.31 VISIT FOR SCREENING MAMMOGRAM: Primary | ICD-10-CM

## 2024-03-29 NOTE — TELEPHONE ENCOUNTER
Order/Referral Request    Who is requesting: Lory @ [facility].    Orders being requested: diagnostic bilateral order.    Reason service is needed/diagnosis: Patient has future appt on 04/24/24 will need the correct order.    When are orders needed by: as soon as possible.    Has this been discussed with Provider: No    Does patient have a preference on a Group/Provider/Facility? No    Does patient have an appointment scheduled?: Yes: 04/24/2024    Where to send orders: Place orders within Epic    [facility] we [facility] or would you prefer to receive a phone call?:   No preference   Okay to leave a detailed message?: No at Home number on file [phone] (home), Work number on file:  There is no work phone number on file., or Cell number on file:    Telephone Information:   Mobile [phone]

## 2024-04-24 ENCOUNTER — ANCILLARY PROCEDURE (OUTPATIENT)
Dept: MAMMOGRAPHY | Facility: CLINIC | Age: 69
End: 2024-04-24
Attending: FAMILY MEDICINE
Payer: MEDICARE

## 2024-04-24 DIAGNOSIS — R92.1 BREAST CALCIFICATION SEEN ON MAMMOGRAM: ICD-10-CM

## 2024-04-24 PROCEDURE — 77065 DX MAMMO INCL CAD UNI: CPT | Mod: RT

## 2024-08-28 ENCOUNTER — MYC REFILL (OUTPATIENT)
Dept: FAMILY MEDICINE | Facility: CLINIC | Age: 69
End: 2024-08-28
Payer: MEDICARE

## 2024-08-28 DIAGNOSIS — I10 ESSENTIAL HYPERTENSION: ICD-10-CM

## 2024-08-28 RX ORDER — AMLODIPINE BESYLATE 5 MG/1
5 TABLET ORAL DAILY
Qty: 90 TABLET | Refills: 3 | OUTPATIENT
Start: 2024-08-28

## 2024-08-29 RX ORDER — AMLODIPINE BESYLATE 5 MG/1
5 TABLET ORAL DAILY
Qty: 90 TABLET | Refills: 2 | Status: SHIPPED | OUTPATIENT
Start: 2024-08-29

## 2024-09-26 ENCOUNTER — MYC MEDICAL ADVICE (OUTPATIENT)
Dept: FAMILY MEDICINE | Facility: CLINIC | Age: 69
End: 2024-09-26
Payer: MEDICARE

## 2024-09-26 DIAGNOSIS — Z87.891 SMOKING HISTORY: Primary | ICD-10-CM

## 2024-10-16 ENCOUNTER — ANCILLARY PROCEDURE (OUTPATIENT)
Dept: MAMMOGRAPHY | Facility: CLINIC | Age: 69
End: 2024-10-16
Attending: FAMILY MEDICINE
Payer: MEDICARE

## 2024-10-16 DIAGNOSIS — R92.8 CATEGORY 3 MAMMOGRAPHY RESULT WITH SHORT FOLLOW-UP INTERVAL SUGGESTED FOR PROBABLY BENIGN FINDING: ICD-10-CM

## 2024-10-16 PROCEDURE — 77066 DX MAMMO INCL CAD BI: CPT

## 2024-10-24 SDOH — HEALTH STABILITY: PHYSICAL HEALTH: ON AVERAGE, HOW MANY MINUTES DO YOU ENGAGE IN EXERCISE AT THIS LEVEL?: 30 MIN

## 2024-10-24 SDOH — HEALTH STABILITY: PHYSICAL HEALTH: ON AVERAGE, HOW MANY DAYS PER WEEK DO YOU ENGAGE IN MODERATE TO STRENUOUS EXERCISE (LIKE A BRISK WALK)?: 2 DAYS

## 2024-10-24 ASSESSMENT — SOCIAL DETERMINANTS OF HEALTH (SDOH): HOW OFTEN DO YOU GET TOGETHER WITH FRIENDS OR RELATIVES?: MORE THAN THREE TIMES A WEEK

## 2024-10-28 ENCOUNTER — OFFICE VISIT (OUTPATIENT)
Dept: FAMILY MEDICINE | Facility: CLINIC | Age: 69
End: 2024-10-28
Payer: MEDICARE

## 2024-10-28 VITALS
DIASTOLIC BLOOD PRESSURE: 82 MMHG | BODY MASS INDEX: 35.16 KG/M2 | TEMPERATURE: 98.8 F | OXYGEN SATURATION: 96 % | WEIGHT: 211 LBS | SYSTOLIC BLOOD PRESSURE: 131 MMHG | HEART RATE: 69 BPM | HEIGHT: 65 IN | RESPIRATION RATE: 16 BRPM

## 2024-10-28 DIAGNOSIS — Z78.0 MENOPAUSE: ICD-10-CM

## 2024-10-28 DIAGNOSIS — Z01.10 EVALUATION OF HEARING IMPAIRMENT: ICD-10-CM

## 2024-10-28 DIAGNOSIS — E78.49 OTHER HYPERLIPIDEMIA: ICD-10-CM

## 2024-10-28 DIAGNOSIS — Z00.00 ANNUAL PHYSICAL EXAM: Primary | ICD-10-CM

## 2024-10-28 DIAGNOSIS — I10 ESSENTIAL HYPERTENSION: ICD-10-CM

## 2024-10-28 LAB
ANION GAP SERPL CALCULATED.3IONS-SCNC: 11 MMOL/L (ref 7–15)
BUN SERPL-MCNC: 20.1 MG/DL (ref 8–23)
CALCIUM SERPL-MCNC: 9.2 MG/DL (ref 8.8–10.4)
CHLORIDE SERPL-SCNC: 107 MMOL/L (ref 98–107)
CHOLEST SERPL-MCNC: 153 MG/DL
CREAT SERPL-MCNC: 0.83 MG/DL (ref 0.51–0.95)
EGFRCR SERPLBLD CKD-EPI 2021: 75 ML/MIN/1.73M2
FASTING STATUS PATIENT QL REPORTED: YES
FASTING STATUS PATIENT QL REPORTED: YES
GLUCOSE SERPL-MCNC: 100 MG/DL (ref 70–99)
HCO3 SERPL-SCNC: 24 MMOL/L (ref 22–29)
HDLC SERPL-MCNC: 44 MG/DL
LDLC SERPL CALC-MCNC: 90 MG/DL
NONHDLC SERPL-MCNC: 109 MG/DL
POTASSIUM SERPL-SCNC: 4.2 MMOL/L (ref 3.4–5.3)
SODIUM SERPL-SCNC: 142 MMOL/L (ref 135–145)
TRIGL SERPL-MCNC: 96 MG/DL

## 2024-10-28 PROCEDURE — 80048 BASIC METABOLIC PNL TOTAL CA: CPT | Performed by: FAMILY MEDICINE

## 2024-10-28 PROCEDURE — 36415 COLL VENOUS BLD VENIPUNCTURE: CPT | Performed by: FAMILY MEDICINE

## 2024-10-28 PROCEDURE — 80061 LIPID PANEL: CPT | Performed by: FAMILY MEDICINE

## 2024-10-28 PROCEDURE — 99214 OFFICE O/P EST MOD 30 MIN: CPT | Mod: 25 | Performed by: FAMILY MEDICINE

## 2024-10-28 PROCEDURE — G0439 PPPS, SUBSEQ VISIT: HCPCS | Performed by: FAMILY MEDICINE

## 2024-10-28 RX ORDER — ATORVASTATIN CALCIUM 10 MG/1
10 TABLET, FILM COATED ORAL DAILY
Qty: 90 TABLET | Refills: 3 | Status: SHIPPED | OUTPATIENT
Start: 2024-10-28

## 2024-10-28 RX ORDER — AMLODIPINE BESYLATE 5 MG/1
5 TABLET ORAL DAILY
Qty: 90 TABLET | Refills: 3 | Status: SHIPPED | OUTPATIENT
Start: 2024-10-28

## 2024-10-28 NOTE — PROGRESS NOTES
Preventive Care Visit  [facility] KAMILA Connors MD, Family Medicine  Oct 28, 2024    Annual physical exam      Essential hypertension  Controlled  - BASIC METABOLIC PANEL  - amLODIPine (NORVASC) 5 MG tablet; Take 1 tablet (5 mg) by mouth daily.    Other hyperlipidemia  - Lipid panel reflex to direct LDL Non-fasting  - atorvastatin (LIPITOR) 10 MG tablet; Take 1 tablet (10 mg) by mouth daily.       Negro Encarnacion is a 69 year old, presenting for the following:  Annual Visit  Hypertension-taking amlodipine 5 mg daily.  Hyperlipidemia-taking Lipitor 20 mg daily.  No side effects reported.    Last mammogram on 10/16/2024, recommended yearly from now.  Scheduled low-dose CT chest for lung cancer screening on 11/18/2024.        10/28/2024     8:21 AM   Additional Questions   Roomed by PAW P   Accompanied by SELF     Health Care Directive  Patient does not have a Health Care Directive:       10/24/2024   General Health   How would you rate your overall physical health? Good   Feel stress (tense, anxious, or unable to sleep) To some extent      (!) STRESS CONCERN      10/24/2024   Nutrition   Diet: Regular (no restrictions)            10/24/2024   Exercise   Days per week of moderate/strenous exercise 2 days   Average minutes spent exercising at this level 30 min      (!) EXERCISE CONCERN      10/24/2024   Social Factors   Frequency of gathering with friends or relatives More than three times a week   Worry food won't last until get money to buy more No   Food not last or not have enough money for food? No   Do you have housing? (Housing is defined as stable permanent housing and does not include staying ouside in a car, in a tent, in an abandoned building, in an overnight shelter, or couch-surfing.) Yes   Are you worried about losing your housing? No   Lack of transportation? No   Unable to get utilities (heat,electricity)? No            10/28/2024   Fall Risk   Gait Speed Test (Document in  seconds) 3.3   Gait Speed Test Interpretation Less than or equal to 5.00 seconds - PASS             10/24/2024   Activities of Daily Living- Home Safety   Needs help with the following daily activites None of the above   Safety concerns in the home None of the above            10/24/2024   Dental   Dentist two times every year? Yes            10/24/2024   Hearing Screening   Hearing concerns? (!) I NEED TO ASK PEOPLE TO SPEAK UP OR REPEAT THEMSELVES.    (!) IT'S HARD TO FOLLOW A CONVERSATION IN A NOISY RESTAURANT OR CROWDED ROOM.    (!) TROUBLE UNDERSTANDING SOFT OR WHISPERED SPEECH.       Multiple values from one day are sorted in reverse-chronological order         10/24/2024   Driving Risk Screening   Patient/family members have concerns about driving No            10/24/2024   General Alertness/Fatigue Screening   Have you been more tired than usual lately? (!) YES            10/24/2024   Urinary Incontinence Screening   Bothered by leaking urine in past 6 months Yes            10/24/2024   TB Screening   Were you born outside of the [place]? No            Today's PHQ-2 Score:       10/28/2024     7:57 AM   PHQ-2 (  [employer])   Q1: Little interest or pleasure in doing things 0    Q2: Feeling down, depressed or hopeless 0    PHQ-2 Score 0    Q1: Little interest or pleasure in doing things Not at all   Q2: Feeling down, depressed or hopeless Not at all   PHQ-2 Score 0       Patient-reported           10/24/2024   Substance Use   Alcohol more than 3/day or more than 7/wk No   Do you have a current opioid prescription? No   How severe/bad is pain from 1 to 10? 4/10   Do you use any other substances recreationally? No        Social History     Tobacco Use    Smoking status: Former     Current packs/day: 0.00     Average packs/day: 1.5 packs/day for 46.1 years (69.2 ttl pk-yrs)     Types: Cigarettes     Start date: 1969     Quit date: 2015     Years since quittin.7     Passive exposure: Past    Smokeless  tobacco: Never   Vaping Use    Vaping status: Never Used   Substance Use Topics    Alcohol use: Yes     Comment: Rarely consume alcohol    Drug use: Yes     Types: Marijuana     Comment: In the 70 s on occasion           10/23/2023   LAST FHS-7 RESULTS   1st degree relative breast or ovarian cancer Yes    Any relative bilateral breast cancer No    Any male have breast cancer No    Any ONE woman have BOTH breast AND ovarian cancer No    Any woman with breast cancer before 50yrs No    2 or more relatives with breast AND/OR ovarian cancer No    2 or more relatives with breast AND/OR bowel cancer No        Patient-reported        Mammogram Screening - Mammogram every 1-2 years updated in Health Maintenance based on mutual decision making      History of abnormal Pap smear: No - age 65 or older with pap indicated due to inadequate prior screening (3 consecutive negative cytology results, 2 consecutive negative cotesting results, or 2 consecutive negative HrHPV test results within 10 years, with the most recent test occurring within the recommended screening interval for the test used)       ASCVD Risk   The 10-year ASCVD risk score (Shima DK, et al., 2019) is: 12.9%    Values used to calculate the score:      Age: 69 years      Sex: Female      Is Non- : No      Diabetic: No      Tobacco smoker: No      Systolic Blood Pressure: 131 mmHg      Is BP treated: Yes      HDL Cholesterol: 40 mg/dL      Total Cholesterol: 152 mg/dL                     Current providers sharing in care for this patient include:  Patient Care Team:  Jorge Connors MD as PCP - General (Family Medicine)  Jorge Connors MD as Assigned PCP  Litzy Nance MD as Assigned Musculoskeletal Provider    The following health maintenance items are reviewed in Epic and correct as of today:  Health Maintenance   Topic Date Due    BMP  06/23/2024    ANNUAL REVIEW OF HM ORDERS  06/23/2024    COVID-19 Vaccine (7 - 2024-25 season)  "09/01/2024    LIPID  10/24/2024    MEDICARE ANNUAL WELLNESS VISIT  10/24/2024    LUNG CANCER SCREENING  11/16/2024    MAMMO SCREENING  10/16/2025    FALL RISK ASSESSMENT  10/28/2025    COLORECTAL CANCER SCREENING  05/26/2026    GLUCOSE  06/23/2026    ADVANCE CARE PLANNING  10/04/2027    DTAP/TDAP/TD IMMUNIZATION (3 - Td or Tdap) 12/12/2028    DEXA  11/03/2036    HEPATITIS C SCREENING  Completed    PHQ-2 (once per calendar year)  Completed    INFLUENZA VACCINE  Completed    Pneumococcal Vaccine: 65+ Years  Completed    ZOSTER IMMUNIZATION  Completed    RSV VACCINE  Completed    HPV IMMUNIZATION  Aged Out    MENINGITIS IMMUNIZATION  Aged Out    RSV MONOCLONAL ANTIBODY  Aged Out         Review of Systems  CONSTITUTIONAL: NEGATIVE for fever, chills, change in weight  ENT/MOUTH: NEGATIVE for ear, mouth and throat problems  RESP: NEGATIVE for significant cough or SOB  CV: NEGATIVE for chest pain, palpitations or peripheral edema     Objective    Exam  /82   Pulse 69   Temp 98.8  F (37.1  C) (Oral)   Resp 16   Ht 1.657 m (5' 5.24\")   Wt 95.7 kg (211 lb)   SpO2 96%   BMI 34.86 kg/m     Estimated body mass index is 34.86 kg/m  as calculated from the following:    Height as of this encounter: 1.657 m (5' 5.24\").    Weight as of this encounter: 95.7 kg (211 lb).    Physical Exam  GENERAL: alert and no distress  NECK: Supple  RESP: lungs clear to auscultation - no rales, rhonchi or wheezes  CV: regular rate and rhythm, normal S1 S2  ABDOMEN: soft, nontender  PSYCH: mentation appears normal        10/28/2024   Mini Cog   Clock Draw Score 2 Normal   3 Item Recall 3 objects recalled   Mini Cog Total Score 5                 Signed Electronically by: Jorge Connors MD    "

## 2024-10-29 ENCOUNTER — PATIENT OUTREACH (OUTPATIENT)
Dept: CARE COORDINATION | Facility: CLINIC | Age: 69
End: 2024-10-29
Payer: MEDICARE

## 2024-11-06 ENCOUNTER — ANCILLARY PROCEDURE (OUTPATIENT)
Dept: BONE DENSITY | Facility: CLINIC | Age: 69
End: 2024-11-06
Attending: FAMILY MEDICINE
Payer: MEDICARE

## 2024-11-06 DIAGNOSIS — Z78.0 MENOPAUSE: ICD-10-CM

## 2024-11-06 PROCEDURE — 77080 DXA BONE DENSITY AXIAL: CPT | Mod: TC | Performed by: PHYSICIAN ASSISTANT

## 2024-11-06 PROCEDURE — 77081 DXA BONE DENSITY APPENDICULR: CPT | Mod: TC | Performed by: PHYSICIAN ASSISTANT

## 2024-11-18 ENCOUNTER — HOSPITAL ENCOUNTER (OUTPATIENT)
Dept: CT IMAGING | Facility: HOSPITAL | Age: 69
Discharge: HOME | End: 2024-11-18
Attending: FAMILY MEDICINE | Admitting: FAMILY MEDICINE
Payer: MEDICARE

## 2024-11-18 DIAGNOSIS — Z87.891 SMOKING HISTORY: ICD-10-CM

## 2024-11-18 PROCEDURE — 71271 CT THORAX LUNG CANCER SCR C-: CPT

## 2024-11-21 ENCOUNTER — DOCUMENTATION ONLY (OUTPATIENT)
Dept: OTHER | Facility: CLINIC | Age: 69
End: 2024-11-21
Payer: MEDICARE

## 2024-11-25 ENCOUNTER — HOSPITAL ENCOUNTER (OUTPATIENT)
Dept: ULTRASOUND IMAGING | Facility: HOSPITAL | Age: 69
Discharge: HOME | End: 2024-11-25
Attending: FAMILY MEDICINE | Admitting: FAMILY MEDICINE
Payer: MEDICARE

## 2024-11-25 DIAGNOSIS — N28.1 RENAL CYST: ICD-10-CM

## 2024-11-25 PROCEDURE — 76775 US EXAM ABDO BACK WALL LIM: CPT

## 2024-12-20 ENCOUNTER — HOSPITAL ENCOUNTER (OUTPATIENT)
Dept: MRI IMAGING | Facility: HOSPITAL | Age: 69
Discharge: HOME | End: 2024-12-20
Attending: FAMILY MEDICINE | Admitting: FAMILY MEDICINE
Payer: MEDICARE

## 2024-12-20 DIAGNOSIS — R93.429 ABNORMAL ULTRASOUND OF KIDNEY: ICD-10-CM

## 2024-12-20 PROCEDURE — 74183 MRI ABD W/O CNTR FLWD CNTR: CPT

## 2024-12-20 PROCEDURE — A9585 GADOBUTROL INJECTION: HCPCS | Performed by: FAMILY MEDICINE

## 2024-12-20 PROCEDURE — 255N000002 HC RX 255 OP 636: Performed by: FAMILY MEDICINE

## 2024-12-20 RX ORDER — GADOBUTROL 604.72 MG/ML
0.1 INJECTION INTRAVENOUS ONCE
Status: COMPLETED | OUTPATIENT
Start: 2024-12-20 | End: 2024-12-20

## 2024-12-20 RX ADMIN — GADOBUTROL 10 ML: 604.72 INJECTION INTRAVENOUS at 11:07

## 2025-01-08 ENCOUNTER — DOCUMENTATION ONLY (OUTPATIENT)
Dept: OTHER | Facility: CLINIC | Age: 70
End: 2025-01-08
Payer: MEDICARE

## 2025-04-14 ENCOUNTER — OFFICE VISIT (OUTPATIENT)
Dept: AUDIOLOGY | Facility: CLINIC | Age: 70
End: 2025-04-14
Payer: MEDICARE

## 2025-04-14 DIAGNOSIS — H90.3 BILATERAL SENSORINEURAL HEARING LOSS: Primary | ICD-10-CM

## 2025-04-14 DIAGNOSIS — Z01.10 EVALUATION OF HEARING IMPAIRMENT: ICD-10-CM

## 2025-04-14 PROCEDURE — 92557 COMPREHENSIVE HEARING TEST: CPT | Performed by: AUDIOLOGIST

## 2025-04-14 PROCEDURE — 92550 TYMPANOMETRY & REFLEX THRESH: CPT | Performed by: AUDIOLOGIST

## 2025-04-14 NOTE — PROGRESS NOTES
AUDIOLOGY REPORT    SUBJECTIVE:  Ayah Garcia is a 70 year old female who was seen in the Audiology Clinic at the [facility] for audiologic evaluation, referred by Jorge Connors M.D.  The patient reports that her  notices that she is not hearing well at home.  She indicates that she has a hard time understanding others when there is background noise present.  There is a history of recreational noise exposure.. The patient denies  bilateral tinnitus, bilateral otalgia, bilateral drainage, bilateral aural fullness, and family history of hearing loss.  They were unaccompanied today.      OBJECTIVE:  Abuse Screening:  Do you feel unsafe at home or work/school? No  Do you feel threatened by someone? No  Does anyone try to keep you from having contact with others, or doing things outside of your home? No  Physical signs of abuse present? No     Fall Risk Screen:  1. Have you fallen two or more times in the past year? No  2. Have you fallen and had an injury in the past year? No    Otoscopic exam indicates ears are clear of cerumen bilaterally     Pure Tone Thresholds assessed using conventional audiometry with good  reliability from 250-8000 Hz bilaterally using insert earphones and circumaural headphones     RIGHT:  normal sloping to moderate sensorineural hearing loss    LEFT:    normal sloping to moderate sensorineural hearing loss    Tympanogram:    RIGHT: normal eardrum mobility    LEFT:   normal eardrum mobility    Reflexes (reported by stimulus ear):  RIGHT: Ipsilateral is present at normal levels  RIGHT: Contralateral is present at normal levels  LEFT:   Ipsilateral is present at normal levels  LEFT:   Contralateral is present at normal levels      Speech Reception Threshold:    RIGHT: 20 dB HL    LEFT:   20 dB HL  Word Recognition Score:     RIGHT: 100% at 60 dB HL using NU-6 recorded word list.    LEFT:   100% at 60 dB HL using NU-6 recorded word list.      ASSESSMENT:   Bilateral  sensorineural hearing loss.      Today s results were discussed with the patient in detail. The patient is a candidate for amplification and was invited to schedule a hearing aid consultation at their convenience.        PLAN:  Patient was counseled regarding hearing loss and impact on communication. Handout on good communication strategies, and hearing aid use was given to patient. It is recommended that the patient repeat hearing testing in 1-2 years or sooner if changes are noticed. .  Please call this clinic with questions regarding these results or recommendations.        Delma Funes.   Doctor of Audiology  License #[id number]

## 2025-04-16 ENCOUNTER — E-VISIT (OUTPATIENT)
Dept: FAMILY MEDICINE | Facility: CLINIC | Age: 70
End: 2025-04-16
Payer: MEDICARE

## 2025-04-16 DIAGNOSIS — M79.662 PAIN OF LEFT LOWER LEG: Primary | ICD-10-CM

## 2025-04-16 PROCEDURE — 99207 PR NON-BILLABLE SERV PER CHARTING: CPT | Performed by: FAMILY MEDICINE

## 2025-04-17 ENCOUNTER — VIRTUAL VISIT (OUTPATIENT)
Dept: FAMILY MEDICINE | Facility: CLINIC | Age: 70
End: 2025-04-17
Payer: MEDICARE

## 2025-04-17 DIAGNOSIS — M79.18 PAIN IN LEFT BUTTOCK: ICD-10-CM

## 2025-04-17 DIAGNOSIS — M79.662 PAIN OF LEFT LOWER LEG: Primary | ICD-10-CM

## 2025-04-17 RX ORDER — METHYLPREDNISOLONE 4 MG/1
TABLET ORAL
Qty: 21 TABLET | Refills: 0 | Status: SHIPPED | OUTPATIENT
Start: 2025-04-17

## 2025-04-17 RX ORDER — CYCLOBENZAPRINE HCL 10 MG
10 TABLET ORAL 3 TIMES DAILY PRN
Qty: 60 TABLET | Refills: 1 | Status: SHIPPED | OUTPATIENT
Start: 2025-04-17

## 2025-04-17 NOTE — TELEPHONE ENCOUNTER
Provider E-Visit time total (minutes): Changed to telephone visit. Patient agreed.    Dr. Jorge Connors  4/17/2025 10:07 AM

## 2025-04-17 NOTE — PROGRESS NOTES
Alysha is a 70 year old who is being evaluated via a billable telephone visit.    Originating Location (pt. Location): Home    Distant Location (provider location):  On-site  Telephone visit completed due to the patient did not consent to a video visit.    Pain of left lower leg  - methylPREDNISolone (MEDROL DOSEPAK) 4 MG tablet therapy pack; Follow Package Directions  - cyclobenzaprine (FLEXERIL) 10 MG tablet; Take 1 tablet (10 mg) by mouth 3 times daily as needed for muscle spasms.  - Physical Therapy  Referral; Future    Pain in left buttock  Radiates to left lower leg.   - Physical Therapy  Referral; Future     Subjective   Alysha is a very pleasant 70 year old, presenting for the following health issues:  She developed left buttock pain 2 days ago, radiates down to her left lower leg.  Reports some tingling and numbness on the left foot.  She cannot stand up or walk.  She has been taking Tylenol and Aleve as needed.  No injury to the back.  No twisting.  No heavy lifting.  She has history of lower back pain but no pain radiates down to the leg.  No urinary or bowel symptoms.    Review of Systems  CONSTITUTIONAL: NEGATIVE for fever, chills, change in weight  ENT/MOUTH: NEGATIVE for ear, mouth and throat problems  RESP: NEGATIVE for significant cough or SOB  CV: NEGATIVE for chest pain/chest pressure      Objective           Vitals:  No vitals were obtained today due to virtual visit.    Physical Exam   General: Alert and no distress //Respiratory: No audible wheeze, cough, or shortness of breath // Psychiatric:  Appropriate affect, tone, and pace of words            Phone call duration: 10.08 minutes  Signed Electronically by: Jorge Connors MD

## 2025-04-19 ENCOUNTER — MYC MEDICAL ADVICE (OUTPATIENT)
Dept: FAMILY MEDICINE | Facility: CLINIC | Age: 70
End: 2025-04-19
Payer: MEDICARE

## 2025-04-19 DIAGNOSIS — M79.18 PAIN IN LEFT BUTTOCK: ICD-10-CM

## 2025-04-19 DIAGNOSIS — M79.662 PAIN OF LEFT LOWER LEG: Primary | ICD-10-CM

## 2025-04-21 ENCOUNTER — VIRTUAL VISIT (OUTPATIENT)
Dept: FAMILY MEDICINE | Facility: CLINIC | Age: 70
End: 2025-04-21
Payer: MEDICARE

## 2025-04-21 DIAGNOSIS — M79.662 PAIN OF LEFT LOWER LEG: Primary | ICD-10-CM

## 2025-04-21 DIAGNOSIS — M79.18 PAIN IN LEFT BUTTOCK: ICD-10-CM

## 2025-04-21 PROCEDURE — 99207 PR NO BILLABLE SERVICE THIS VISIT: CPT | Mod: 93 | Performed by: FAMILY MEDICINE

## 2025-04-21 RX ORDER — GABAPENTIN 300 MG/1
300 CAPSULE ORAL AT BEDTIME
Qty: 90 CAPSULE | Refills: 0 | Status: SHIPPED | OUTPATIENT
Start: 2025-04-21

## 2025-04-21 NOTE — PROGRESS NOTES
Called and spoke to the patient.  The pain is improving from 10/10 to 8/10.  Ambulating little better but with help.  Wondering if she can increase Flexeril, currently taking Flexeril 10 mg 3 times a day.  Unable to set up appointment for PT, she cannot go to PT placed yet.  Added gabapentin.  Discussed potential side effects including drowsiness especially with Flexeril.  She will work send a [employer] message with update in a few days.  
MED/SURG

## 2025-04-23 NOTE — TELEPHONE ENCOUNTER
Writer pended Home Care referral to the best of writer's knowledge.    Routing message to PCP to review and approve, if appropriate.    ANY WymanN, RN, PHN   [facility]

## 2025-04-24 NOTE — TELEPHONE ENCOUNTER
Printed Referral for Home Care PT.     Faxed to [facility]: [phone].    ANY WymanN, RN, PHN   [facility]

## 2025-04-28 ENCOUNTER — TELEPHONE (OUTPATIENT)
Dept: FAMILY MEDICINE | Facility: CLINIC | Age: 70
End: 2025-04-28
Payer: MEDICARE

## 2025-04-28 NOTE — TELEPHONE ENCOUNTER
Home Care is calling regarding an established patient with [facility].  Requesting orders from: Jorge Connors  RN APPROVED: RN able to provide verbal orders.  Home Care will send orders for signature.  RN will close encounter.  Is this a request for a temporary pause in the home care episode?  No    Orders Requested    Physical Therapy  Request for initial certification (first set of orders)   Frequency: 2x/wk for 1 wk, then 1x/wk for 8 wks  RN gave verbal order: Yes        Okay to leave a detailed message?: Yes    Contacts       Contact Date/Time Type Contact Phone/Fax    04/28/2025 10:04 AM CDT Phone (Incoming) Alex Gomes PT with [facility] (Home Care) [phone]          Alex eDe, RN

## 2025-05-06 ENCOUNTER — HOSPITAL ENCOUNTER (OUTPATIENT)
Dept: MRI IMAGING | Facility: HOSPITAL | Age: 70
Discharge: HOME | End: 2025-05-06
Attending: FAMILY MEDICINE | Admitting: FAMILY MEDICINE
Payer: MEDICARE

## 2025-05-06 DIAGNOSIS — M54.42 MIDLINE LOW BACK PAIN WITH LEFT-SIDED SCIATICA, UNSPECIFIED CHRONICITY: ICD-10-CM

## 2025-05-06 PROCEDURE — 72148 MRI LUMBAR SPINE W/O DYE: CPT

## 2025-05-06 NOTE — CONFIDENTIAL NOTE
NEUROSURGERY - NEW PREVISIT PLANNING    Referring Provider: Jorge Connors MD    OVN 5/2/2025   Reason For Visit: M54.42 (ICD-10-CM) - Midline low back pain with left-sided sciatica, unspecified chronicity        IMAGING STATUS/LOCATION DATE/TYPE   MRI PACS 05/06/2025  Lumbar  [facility]   CT N/A    XRAY N/A    NOTES STATUS/LOCATION DATE/TYPE   Other specialist OVN: N/A    EMG N/A    INJECTION N/A    PHYSICAL THERAPY N/A    SURGERY N/A      Does patient have C2C?  Year last updated Action     YES   []      Please update at appointment if outdated more than 5 years       NO     [x]   N/A   Please complete C2C at appointment

## 2025-05-08 ENCOUNTER — OFFICE VISIT (OUTPATIENT)
Dept: NEUROSURGERY | Facility: CLINIC | Age: 70
End: 2025-05-08
Payer: MEDICARE

## 2025-05-08 ENCOUNTER — PRE VISIT (OUTPATIENT)
Dept: NEUROSURGERY | Facility: CLINIC | Age: 70
End: 2025-05-08

## 2025-05-08 VITALS — SYSTOLIC BLOOD PRESSURE: 121 MMHG | OXYGEN SATURATION: 95 % | DIASTOLIC BLOOD PRESSURE: 78 MMHG | HEART RATE: 86 BPM

## 2025-05-08 DIAGNOSIS — M54.16 LUMBAR RADICULOPATHY: Primary | ICD-10-CM

## 2025-05-08 DIAGNOSIS — M71.38 SYNOVIAL CYST OF LUMBAR FACET JOINT: ICD-10-CM

## 2025-05-08 ASSESSMENT — PAIN SCALES - GENERAL: PAINLEVEL_OUTOF10: MODERATE PAIN (4)

## 2025-05-08 NOTE — PROGRESS NOTES
[facility] Neurosurgery Clinic Visit      CC:   Low back pain  LLE pain    Primary Care Provider: Jorge Connors    Reason For Visit:   I was asked by Dr. Connors to see this patient in consultation.       HPI: Ayah Garcia is a 70 year old female who presents for evaluation of low back pain and LLE pain. Symptoms developed about 1 month ago, possibly related to a fall in February. Today, patient reports left sided low back pain that radiates to left buttocks, hip, lateral and medial thigh, lateral calf, and dorsal aspect of left foot. She also reports some left leg weakness due to pain. Describes the pain as sharp, throbbing, burning. Symptoms worsen with walking and sitting. She recently started using a cane. Denies any numbness, tingling, foot drop, saddle anesthesia, or bladder/bowel incontinence. Patient has tried conservative treatment including tylenol #3, flexeril, gabapentin, NSAIDS, medrol dosepak, ice and heat packs, and PT.     Past Medical History:   Diagnosis Date    Arthritis 2000    Mild    Depressive disorder 2015    Short term, no  issues since    Essential hypertension 10/28/2024    Former smoker     Restless leg syndrome        Past Medical History reviewed with patient during visit.    Past Surgical History:   Procedure Laterality Date    APPENDECTOMY OPEN  01/01/1971    bladder lift      2007    COLONOSCOPY      ?    GENITOURINARY SURGERY      OVARIAN CYST REMOVAL N/A 1986    Z TOTAL ABDOM HYSTERECTOMY  10/30/1986    ovarian cysts, endometriosis     Past Surgical History reviewed with patient during visit.    Current Outpatient Medications   Medication Sig Dispense Refill    acetaminophen-codeine (TYLENOL #3) 300-30 MG per tablet Take 1 tablet by mouth every 6 hours as needed for severe pain. 30 tablet 0    amLODIPine (NORVASC) 5 MG tablet Take 1 tablet (5 mg) by mouth daily. 90 tablet 3    atorvastatin (LIPITOR) 10 MG tablet Take 1 tablet (10 mg) by mouth daily. 90 tablet 3    Blood  Pressure Monitoring (COMFORT TOUCH BP CUFF/MEDIUM) MISC 1 Units daily To check home BP daily 1 each 0    cyclobenzaprine (FLEXERIL) 10 MG tablet Take 1 tablet (10 mg) by mouth 3 times daily as needed for muscle spasms. 60 tablet 1    gabapentin (NEURONTIN) 300 MG capsule Take 1 capsule (300 mg) by mouth at bedtime. 90 capsule 0    methylPREDNISolone (MEDROL DOSEPAK) 4 MG tablet therapy pack Follow Package Directions 21 tablet 0    Multiple Vitamins-Minerals (MULTI COMPLETE PO) Take 1 capsule by mouth daily      ondansetron (ZOFRAN) 4 MG tablet Take 1 tablet (4 mg) by mouth every 6 hours as needed for nausea or vomiting. 30 tablet 0    triamcinolone (KENALOG) 0.1 % external cream Apply topically 2 times daily 30 g 3    vitamin D3 (CHOLECALCIFEROL) 50 mcg (2000 units) tablet Take 1 tablet by mouth daily      estradiol (VAGIFEM) 10 MCG TABS vaginal tablet Place 1 tablet (10 mcg) vaginally twice a week (Patient not taking: Reported on 10/28/2024) 12 tablet 4     No current facility-administered medications for this visit.       Allergies   Allergen Reactions    Codeine Nausea and Nausea and Vomiting       Social History     Socioeconomic History    Marital status:     Number of children: 1   Occupational History    Occupation: Retired   Tobacco Use    Smoking status: Former     Current packs/day: 0.00     Average packs/day: 1.5 packs/day for 46.1 years (69.2 ttl pk-yrs)     Types: Cigarettes     Start date: 1/1/1969     Quit date: 2/14/2015     Years since quitting: 10.2     Passive exposure: Past    Smokeless tobacco: Never   Vaping Use    Vaping status: Never Used   Substance and Sexual Activity    Alcohol use: Yes     Comment: Rarely consume alcohol    Drug use: Yes     Types: Marijuana     Comment: In the 70 s on occasion    Sexual activity: Yes     Partners: Male     Birth control/protection: Post-menopausal, Female Surgical, None   Other Topics Concern    Parent/sibling w/ CABG, MI or angioplasty before 65F  55M? Yes     Comment: Father (50)     Social Drivers of Health     Financial Resource Strain: Low Risk  (10/24/2024)    Financial Resource Strain     Within the past 12 months, have you or your family members you live with been unable to get utilities (heat, electricity) when it was really needed?: No   Food Insecurity: Low Risk  (10/24/2024)    Food Insecurity     Within the past 12 months, did you worry that your food would run out before you got money to buy more?: No     Within the past 12 months, did the food you bought just not last and you didn t have money to get more?: No   Transportation Needs: Low Risk  (10/24/2024)    Transportation Needs     Within the past 12 months, has lack of transportation kept you from medical appointments, getting your medicines, non-medical meetings or appointments, work, or from getting things that you need?: No   Physical Activity: Insufficiently Active (10/24/2024)    Exercise Vital Sign     Days of Exercise per Week: 2 days     Minutes of Exercise per Session: 30 min   Stress: Stress Concern Present (10/24/2024)    [employer] [employer] - Occupational Stress Questionnaire     Feeling of Stress : To some extent   Social Connections: Unknown (10/24/2024)    Social Connection and Isolation Panel [NHANES]     Frequency of Social Gatherings with Friends and Family: More than three times a week   Interpersonal Safety: Low Risk  (10/28/2024)    Interpersonal Safety     Do you feel physically and emotionally safe where you currently live?: Yes     Within the past 12 months, have you been hit, slapped, kicked or otherwise physically hurt by someone?: No     Within the past 12 months, have you been humiliated or emotionally abused in other ways by your partner or ex-partner?: No   Housing Stability: Low Risk  (10/24/2024)    Housing Stability     Do you have housing? : Yes     Are you worried about losing your housing?: No       Family History   Problem Relation Age  of Onset    Ovarian Cancer Mother         d age 45    Other Cancer Mother         Ovarian    Coronary Artery Disease Father         MI age 40    Melanoma Father         d late 60    Other Cancer Father         Melanoma    Pulmonary fibrosis Sister     Coronary Artery Disease Maternal Grandmother     Suicide Paternal Grandfather     Breast Cancer Maternal Aunt 60    Breast Cancer Other         maternal aunt       ROS: 10 point ROS neg other than the symptoms noted above in the HPI.    Vital Signs: /78   Pulse 86   SpO2 95%     Neurological Examination:  Awake  Alert  Oriented x 3  Speech clear    Motor exam:  Iliopsoas  (hip flexion)               Right: 5/5  Left:  5/5  Quadriceps  (knee extension)       Right:  5/5  Left:  5/5  Hamstrings  (knee flexion)            Right:  5/5  Left:  5/5  Gastroc Soleus  (PF)                          Right:  5/5  Left:  5/5  Tibialis Ant  (DF)                          Right:  5/5  Left:  5/5  EHL                          Right:  5/5  Left:  5/5         Sensation intact to light touch in BLE.   Negative clonus bilaterally.   No tenderness to palpation of the lumbar spine.  Negative straight leg raise bilaterally.    Gait: Able to stand from a seated position. Antalgic gait. Ambulates with cane/walker.     Imaging:   MR LUMBAR SPINE W/O CONTRAST  LOCATION: [facility]  DATE: 5/6/2025  IMPRESSION:  1.  Multilevel lumbar spondylosis without high-grade spinal canal stenosis. Ventral synovial cyst originating from the left facet joint at L3-L4 abuts the descending cauda equina nerve roots.  2.  Moderate left neural foraminal stenosis at L5-S1.    Assessment:  70 year old female who presents for evaluation of left sided low back pain that radiates to left leg and foot. Lumbar spine MRI reveals a synovial cyst at left L3-4 facet joint and moderate left foraminal stenosis at L5-S1.  Patient has tried PT and pain medications but symptoms persist. She would  like to focus on non surgical treatment at this time. We discussed symptoms, imaging, and next steps.      Plan:   -Referral to [facility] for lumbar MANJULA   -Advised patient to follow-up with Dr. Jauregui if symptoms persist     Advised patient to call our clinic with any questions or concerns. Patient voiced understanding and agreement.      Megan Watson CNP  [facility]  Clinic phone number: [phone]  Securely message or page via [employer] Secure [employer], [employer], or [employer]

## 2025-05-08 NOTE — PATIENT INSTRUCTIONS
Referrals:   [facility] for lumbar injection.     Okay to continue with physical therapy as tolerated.     Please call our clinic if symptoms persist, change, or worsen at any time.

## 2025-05-08 NOTE — NURSING NOTE
"Ayah Garcia is a 70 year old female who presents for:  Chief Complaint   Patient presents with    Consult     Midline low back pain with left-sided sciatica           Initial Vitals:  /78   Pulse 86   SpO2 95%  Estimated body mass index is 34.86 kg/m  as calculated from the following:    Height as of 10/28/24: 5' 5.24\" (1.657 m).    Weight as of 10/28/24: 211 lb (95.7 kg).. There is no height or weight on file to calculate BSA. BP completed using cuff size: regular  Moderate Pain (4)    Nursing Comments:       Symone Doyle    "

## 2025-05-08 NOTE — LETTER
5/8/2025      Ayah Garcia  [address]  [place] [address]      Dear Colleague,    Thank you for referring your patient, Ayah Garcia, to the [facility]. Please see a copy of my visit note below.    [facility] Visit      CC:   Low back pain  LLE pain    Primary Care Provider: Jorge Connors    Reason For Visit:   I was asked by Dr. Connors to see this patient in consultation.       HPI: Ayah Garcia is a 70 year old female who presents for evaluation of low back pain and LLE pain. Symptoms developed about 1 month ago, possibly related to a fall in February. Today, patient reports left sided low back pain that radiates to left buttocks, hip, lateral and medial thigh, lateral calf, and dorsal aspect of left foot. She also reports some left leg weakness due to pain. Describes the pain as sharp, throbbing, burning. Symptoms worsen with walking and sitting. She recently started using a cane. Denies any numbness, tingling, foot drop, saddle anesthesia, or bladder/bowel incontinence. Patient has tried conservative treatment including tylenol #3, flexeril, gabapentin, NSAIDS, medrol dosepak, ice and heat packs, and PT.     Past Medical History:   Diagnosis Date     Arthritis 2000    Mild     Depressive disorder 2015    Short term, no  issues since     Essential hypertension 10/28/2024     Former smoker      Restless leg syndrome        Past Medical History reviewed with patient during visit.    Past Surgical History:   Procedure Laterality Date     APPENDECTOMY OPEN  01/01/1971     bladder lift      2007     COLONOSCOPY      ?     GENITOURINARY SURGERY       OVARIAN CYST REMOVAL N/A 1986     [facility] TOTAL ABDOM HYSTERECTOMY  10/30/1986    ovarian cysts, endometriosis     Past Surgical History reviewed with patient during visit.    Current Outpatient Medications   Medication Sig Dispense Refill     acetaminophen-codeine (TYLENOL #3) 300-30  MG per tablet Take 1 tablet by mouth every 6 hours as needed for severe pain. 30 tablet 0     amLODIPine (NORVASC) 5 MG tablet Take 1 tablet (5 mg) by mouth daily. 90 tablet 3     atorvastatin (LIPITOR) 10 MG tablet Take 1 tablet (10 mg) by mouth daily. 90 tablet 3     Blood Pressure Monitoring (COMFORT TOUCH BP CUFF/MEDIUM) MISC 1 Units daily To check home BP daily 1 each 0     cyclobenzaprine (FLEXERIL) 10 MG tablet Take 1 tablet (10 mg) by mouth 3 times daily as needed for muscle spasms. 60 tablet 1     gabapentin (NEURONTIN) 300 MG capsule Take 1 capsule (300 mg) by mouth at bedtime. 90 capsule 0     methylPREDNISolone (MEDROL DOSEPAK) 4 MG tablet therapy pack Follow Package Directions 21 tablet 0     Multiple Vitamins-Minerals (MULTI COMPLETE PO) Take 1 capsule by mouth daily       ondansetron (ZOFRAN) 4 MG tablet Take 1 tablet (4 mg) by mouth every 6 hours as needed for nausea or vomiting. 30 tablet 0     triamcinolone (KENALOG) 0.1 % external cream Apply topically 2 times daily 30 g 3     vitamin D3 (CHOLECALCIFEROL) 50 mcg (2000 units) tablet Take 1 tablet by mouth daily       estradiol (VAGIFEM) 10 MCG TABS vaginal tablet Place 1 tablet (10 mcg) vaginally twice a week (Patient not taking: Reported on 10/28/2024) 12 tablet 4     No current facility-administered medications for this visit.       Allergies   Allergen Reactions     Codeine Nausea and Nausea and Vomiting       Social History     Socioeconomic History     Marital status:      Number of children: 1   Occupational History     Occupation: Retired   Tobacco Use     Smoking status: Former     Current packs/day: 0.00     Average packs/day: 1.5 packs/day for 46.1 years (69.2 ttl pk-yrs)     Types: Cigarettes     Start date: 1/1/1969     Quit date: 2/14/2015     Years since quitting: 10.2     Passive exposure: Past     Smokeless tobacco: Never   Vaping Use     Vaping status: Never Used   Substance and Sexual Activity     Alcohol use: Yes      Comment: Rarely consume alcohol     Drug use: Yes     Types: Marijuana     Comment: In the 70 s on occasion     Sexual activity: Yes     Partners: Male     Birth control/protection: Post-menopausal, Female Surgical, None   Other Topics Concern     Parent/sibling w/ CABG, MI or angioplasty before 65F 55M? Yes     Comment: Father (50)     Social Drivers of Health     Financial Resource Strain: Low Risk  (10/24/2024)    Financial Resource Strain      Within the past 12 months, have you or your family members you live with been unable to get utilities (heat, electricity) when it was really needed?: No   Food Insecurity: Low Risk  (10/24/2024)    Food Insecurity      Within the past 12 months, did you worry that your food would run out before you got money to buy more?: No      Within the past 12 months, did the food you bought just not last and you didn t have money to get more?: No   Transportation Needs: Low Risk  (10/24/2024)    Transportation Needs      Within the past 12 months, has lack of transportation kept you from medical appointments, getting your medicines, non-medical meetings or appointments, work, or from getting things that you need?: No   Physical Activity: Insufficiently Active (10/24/2024)    Exercise Vital Sign      Days of Exercise per Week: 2 days      Minutes of Exercise per Session: 30 min   Stress: Stress Concern Present (10/24/2024)    [employer] - Occupational Stress Questionnaire      Feeling of Stress : To some extent   Social Connections: Unknown (10/24/2024)    Social Connection and Isolation Panel [NHANES]      Frequency of Social Gatherings with Friends and Family: More than three times a week   Interpersonal Safety: Low Risk  (10/28/2024)    Interpersonal Safety      Do you feel physically and emotionally safe where you currently live?: Yes      Within the past 12 months, have you been hit, slapped, kicked or otherwise physically hurt by someone?: No       Within the past 12 months, have you been humiliated or emotionally abused in other ways by your partner or ex-partner?: No   Housing Stability: Low Risk  (10/24/2024)    Housing Stability      Do you have housing? : Yes      Are you worried about losing your housing?: No       Family History   Problem Relation Age of Onset     Ovarian Cancer Mother         d age 45     Other Cancer Mother         Ovarian     Coronary Artery Disease Father         MI age 40     Melanoma Father         d late 60     Other Cancer Father         Melanoma     Pulmonary fibrosis Sister      Coronary Artery Disease Maternal Grandmother      Suicide Paternal Grandfather      Breast Cancer Maternal Aunt 60     Breast Cancer Other         maternal aunt       ROS: 10 point ROS neg other than the symptoms noted above in the HPI.    Vital Signs: /78   Pulse 86   SpO2 95%     Neurological Examination:  Awake  Alert  Oriented x 3  Speech clear    Motor exam:  Iliopsoas  (hip flexion)               Right: 5/5  Left:  5/5  Quadriceps  (knee extension)       Right:  5/5  Left:  5/5  Hamstrings  (knee flexion)            Right:  5/5  Left:  5/5  Gastroc Soleus  (PF)                          Right:  5/5  Left:  5/5  Tibialis Ant  (DF)                          Right:  5/5  Left:  5/5  EHL                          Right:  5/5  Left:  5/5         Sensation intact to light touch in BLE.   Negative clonus bilaterally.   No tenderness to palpation of the lumbar spine.  Negative straight leg raise bilaterally.    Gait: Able to stand from a seated position. Antalgic gait. Ambulates with cane/walker.     Imaging:   MR LUMBAR SPINE W/O CONTRAST  LOCATION: [facility]  DATE: 5/6/2025  IMPRESSION:  1.  Multilevel lumbar spondylosis without high-grade spinal canal stenosis. Ventral synovial cyst originating from the left facet joint at L3-L4 abuts the descending cauda equina nerve roots.  2.  Moderate left neural foraminal stenosis  at L5-S1.    Assessment:  70 year old female who presents for evaluation of left sided low back pain that radiates to left leg and foot. Lumbar spine MRI reveals a synovial cyst at left L3-4 facet joint and moderate left foraminal stenosis at L5-S1.  Patient has tried PT and pain medications but symptoms persist. She would like to focus on non surgical treatment at this time. We discussed symptoms, imaging, and next steps.      Plan:   -Referral to [facility] for lumbar MANJULA   -Advised patient to follow-up with Dr. Jauregui if symptoms persist     Advised patient to call our clinic with any questions or concerns. Patient voiced understanding and agreement.      Megan Watson CNP  [facility]  Clinic phone number: [phone]  Securely message or page via Epic Secure Chat, LiveSchool, or fluid Operations         Again, thank you for allowing me to participate in the care of your patient.        Sincerely,        Megan Watson NP    Electronically signed

## 2025-05-09 ENCOUNTER — RESULTS FOLLOW-UP (OUTPATIENT)
Dept: FAMILY MEDICINE | Facility: CLINIC | Age: 70
End: 2025-05-09

## 2025-05-13 ENCOUNTER — TELEPHONE (OUTPATIENT)
Dept: PALLIATIVE MEDICINE | Facility: OTHER | Age: 70
End: 2025-05-13
Payer: MEDICARE

## 2025-05-13 DIAGNOSIS — M54.16 LUMBAR RADICULOPATHY: Primary | ICD-10-CM

## 2025-05-13 NOTE — TELEPHONE ENCOUNTER
Pre procedure review/order placement:  lumbar MANJULA, left L3-4 and left L5-S1    Please enter scheduling template questions

## 2025-05-14 ENCOUNTER — TELEPHONE (OUTPATIENT)
Dept: NEUROSURGERY | Facility: CLINIC | Age: 70
End: 2025-05-14
Payer: MEDICARE

## 2025-05-14 NOTE — TELEPHONE ENCOUNTER
Health Call Center    Phone Message    May a detailed message be left on voicemail: yes     Reason for Call: Other: Shannon calling wondering if Megan would be willing to sign the initial plan of care for this patient.  All others will go to Dr. Connors.  Please call her back to advise.       Action Taken: Message routed to:  Other: [facility]     Travel Screening: Not Applicable     Date of Service:

## 2025-05-14 NOTE — TELEPHONE ENCOUNTER
I have attempted to contact this patient by phone with the following results: left message to return my call to go over the pre-screening questions, I will continue to try later.    Christiane Álvarez      [facility]  Pain [facility]

## 2025-05-14 NOTE — TELEPHONE ENCOUNTER
Home care placed by Dr. Connors, patients PCP. POC should be signed by ordering provider.     Returned call to provide this update, left SOFY.

## 2025-05-20 ENCOUNTER — OFFICE VISIT (OUTPATIENT)
Dept: FAMILY MEDICINE | Facility: CLINIC | Age: 70
End: 2025-05-20
Payer: MEDICARE

## 2025-05-20 VITALS
TEMPERATURE: 98.1 F | HEIGHT: 65 IN | OXYGEN SATURATION: 96 % | BODY MASS INDEX: 35.49 KG/M2 | HEART RATE: 83 BPM | RESPIRATION RATE: 16 BRPM | WEIGHT: 213 LBS

## 2025-05-20 DIAGNOSIS — M54.42 MIDLINE LOW BACK PAIN WITH LEFT-SIDED SCIATICA, UNSPECIFIED CHRONICITY: Primary | ICD-10-CM

## 2025-05-20 PROCEDURE — 99213 OFFICE O/P EST LOW 20 MIN: CPT

## 2025-05-20 NOTE — PROGRESS NOTES
Assessment & Plan    Midline low back pain with left-sided sciatica, unspecified chronicity  - Low back pain with left-sided sciatica, ongoing since the Tuesday before Easter. No prior history of sciatica. Pain management includes Tylenol and ibuprofen, with caution advised for NSAID use due to age-related kidney function decline.  - Continue with outpatient physical therapy. Use Tylenol for pain management. Avoid excessive use of NSAIDs. No immediate need for kidney function recheck.       Please seek immediate medical attention (go to the emergency room or urgent care) if symptoms worser or for concerning changes.    Follow-up with PCP for ongoing management, as needed for acute concern, and May schedule follow-up with me for as needed concerns if unable to see PCP due to scheduling availability     ---------------------------------------------------------------------------------------   Subjective   Ayah is a 70 year old year old female, presenting for the following health issues:  Chief Complaint   Patient presents with    Face to Face      insurance will not pay for Homecare if Pt does not have a FTF, Midline low back pain with left-sided sciatica         5/20/2025     1:49 PM   Additional Questions   Roomed by AMADO Chu   Accompanied by self     HPI  Alysha Garcia, a 70-year-old female, reported experiencing lower back pain that began 7 weeks ago. She has never had sciatic pain before and described an incident where she was baking cookies when the pain started. She mentioned a fall in February while in town, but she did not experience any problems until April. The back pain has improved with physical therapy, but she still experiences occasional low back pain. She also reported an electric shock-like sensation that occasionally shoots down her left buttock, lasting only a second. Alysha has been using a cane due to this intermittent sensation. She noted feeling tired, attributing it to muscle  "mass loss from being bedridden for an extended period. She has not taken Tylenol 3 for over a week and previously cut the doses in half. She primarily uses regular Tylenol or ibuprofen for pain management. Alysha mentioned that Tylenol with codeine makes her nauseous, especially when she has to get up and walk, but it is manageable when she is lying down. She has not taken cyclobenzaprine (Flexeril) for a long time, although she still has some available. She takes gabapentin (Neurontin) at night to aid sleep, which she reports is effective. Alysha is also using Zofran for nausea, which she is managing well.       Patient submitted visit information  Answers submitted by the patient for this visit:  Provider Visit on 5/20/2025  2:15 PM with Ramone Littlejohn  General Questionnaire (Submitted on 5/18/2025)  Chief Complaint: Chronic problems general questions HPI Form  What is the reason for your visit today? : Follow up for back pain  How many days per week do you miss taking your medication?: 0  ---------------------------------------------------------------------------------------   Objective    Vital signs: Pulse 83   Temp 98.1  F (36.7  C) (Oral)   Resp 16   Ht 1.657 m (5' 5.24\")   Wt 96.6 kg (213 lb)   SpO2 96%   BMI 35.18 kg/m      Body mass index is 35.18 kg/m .    Physical Exam    General appearance: alert, well appearing, and in no distress  Mental status: alert, oriented to person, place, and time  Heart: normal rate, regular rhythm, normal S1, S2, no murmurs, rubs, clicks or gallops  Lungs: clear to auscultation, no wheezes, rales or rhonchi, symmetric air entry  Back: Pain with Rom and twisting although no limit to ROM, strait leg test positive on left side only     ---------------------------------------------------------------------------------------   Options for treatment and follow-up care were reviewed with the patient. Ayah Garcia and/or guardian was engaged and actively involved in " the decision making process. Ayah Garcia and/or guardian verbalized understanding of the options discussed and was satisfied with the final plan.         Patient consented to use of ambient AI scribe prior to initiation of visit.    Signed Electronically by: SCAR Saleem CNP

## 2025-05-27 ENCOUNTER — TELEPHONE (OUTPATIENT)
Dept: PALLIATIVE MEDICINE | Facility: OTHER | Age: 70
End: 2025-05-27
Payer: MEDICARE

## 2025-05-27 NOTE — TELEPHONE ENCOUNTER
Preprocedure reminder call Lumbar MANJULA    Arrival time 0915 am    Location: [facility] [address] [place] [address]    Do you have a ? yes    If patient doesn't have a  they will need to call and reschedule    Has the patient had a flu shot or any other vaccinations within the past 7 days? no    If yes, explain that for the vaccine to work best they need to:              wait 1 week before and 1 week after getting any Vaccine           wait 1 week before and 2 weeks after getting any Covid Vaccine    If patient has concerns about the timing, send to RN pool    Have you had any antibiotics in the last five days? no (Oral steroid ok per Ortega but note the patient is taking it or not)    If yes check with the nurse or provider. The procedure will most likely need to be rescheduled.    Its ok to eat and drink as usual and take your  BP and diabetes medications if this applies to you. ok (note patient has been informed yes or no)      To call and reschedule or talk to the nurse about any questions you may have please dial    [phone]

## 2025-05-28 ENCOUNTER — RADIOLOGY INJECTION OFFICE VISIT (OUTPATIENT)
Dept: PALLIATIVE MEDICINE | Facility: OTHER | Age: 70
End: 2025-05-28
Attending: NURSE PRACTITIONER
Payer: MEDICARE

## 2025-05-28 VITALS — OXYGEN SATURATION: 97 % | DIASTOLIC BLOOD PRESSURE: 80 MMHG | HEART RATE: 75 BPM | SYSTOLIC BLOOD PRESSURE: 136 MMHG

## 2025-05-28 DIAGNOSIS — M54.16 LUMBAR RADICULOPATHY: Primary | ICD-10-CM

## 2025-05-28 DIAGNOSIS — M71.38 SYNOVIAL CYST OF LUMBAR FACET JOINT: ICD-10-CM

## 2025-05-28 PROCEDURE — 250N000011 HC RX IP 250 OP 636: Performed by: STUDENT IN AN ORGANIZED HEALTH CARE EDUCATION/TRAINING PROGRAM

## 2025-05-28 PROCEDURE — 255N000002 HC RX 255 OP 636: Performed by: STUDENT IN AN ORGANIZED HEALTH CARE EDUCATION/TRAINING PROGRAM

## 2025-05-28 PROCEDURE — 64483 NJX AA&/STRD TFRM EPI L/S 1: CPT | Mod: LT | Performed by: STUDENT IN AN ORGANIZED HEALTH CARE EDUCATION/TRAINING PROGRAM

## 2025-05-28 PROCEDURE — 250N000009 HC RX 250: Performed by: STUDENT IN AN ORGANIZED HEALTH CARE EDUCATION/TRAINING PROGRAM

## 2025-05-28 RX ORDER — LIDOCAINE HYDROCHLORIDE 10 MG/ML
1 INJECTION, SOLUTION EPIDURAL; INFILTRATION; INTRACAUDAL; PERINEURAL ONCE
Status: COMPLETED | OUTPATIENT
Start: 2025-05-28 | End: 2025-05-28

## 2025-05-28 RX ORDER — DEXAMETHASONE SODIUM PHOSPHATE 10 MG/ML
10 INJECTION, SOLUTION INTRA-ARTICULAR; INTRALESIONAL; INTRAMUSCULAR; INTRAVENOUS; SOFT TISSUE ONCE
Status: COMPLETED | OUTPATIENT
Start: 2025-05-28 | End: 2025-05-28

## 2025-05-28 RX ADMIN — IOHEXOL 1 ML: 180 INJECTION INTRAVENOUS at 10:03

## 2025-05-28 RX ADMIN — LIDOCAINE HYDROCHLORIDE 1 ML: 10 INJECTION, SOLUTION EPIDURAL; INFILTRATION; INTRACAUDAL; PERINEURAL at 11:40

## 2025-05-28 RX ADMIN — DEXAMETHASONE SODIUM PHOSPHATE 10 MG: 10 INJECTION INTRAMUSCULAR; INTRAVENOUS at 11:40

## 2025-05-28 ASSESSMENT — PAIN SCALES - GENERAL
PAINLEVEL_OUTOF10: MILD PAIN (3)
PAINLEVEL_OUTOF10: MODERATE PAIN (5)

## 2025-05-28 NOTE — PATIENT INSTRUCTIONS
[facility] [place]   Procedure Discharge Instructions    Today you saw:    Dr. Ortega    You had an:  Left Lumbar Epidural Steroid Injection       Medications used:  Lidocaine  Dexamethasone Omnipaque            If you were holding your blood thinning medication, please restart taking it: N/A  Be cautious when walking. Numbness and/or weakness in the lower extremities may occur for up to 6-8 hours after the procedure due to effect of the local anesthetic  Do not drive for 6 hours. The effect of the local anesthetic could slow your reflexes.   You may resume your regular activities after 24 hours  Avoid strenuous activity for the first 24 hours  You may shower, however avoid swimming, tub baths or hot tubs for 24 hours following your procedure  You may have a mild to moderate increase in pain for several days following the injection.  It may take up to 14 days for the steroid medication to start working although you may feel the effect as early as a few days after the procedure.     You may use ice packs for 10-15 minutes, 3 to 4 times a day at the injection site for comfort  Do not use heat to painful areas for 6 to 8 hours. This will give the local anesthetic time to wear off and prevent you from accidentally burning your skin.   Unless you have been directed to avoid the use of anti-inflammatory medications (NSAIDS), you may use medications such as ibuprofen, Aleve or Tylenol for pain control if needed.   If you were fasting, you may resume your normal diet and medications after the procedure  If you have diabetes, check your blood sugar more frequently than usual as your blood sugar may be higher than normal for 10-14 days following a steroid injection. Contact your doctor who manages your diabetes if your blood sugar is higher than usual  Possible side effects of steroids that you may experience include flushing, elevated blood pressure, increased appetite, mild headaches and  restlessness.  All of these symptoms will get better with time.  If you experience any of the following, call the Pain Clinic at [phone]:  -Fever over 100 degree F  -Swelling, bleeding, redness, drainage, warmth at the injection site  -Progressive weakness or numbness in your legs or arms  -Loss of bowel or bladder function  -Unusual headache that is not relieved by Tylenol or other pain reliever  -Unusual new onset of pain that is not improving

## 2025-05-28 NOTE — PROGRESS NOTES
Pre procedure Diagnosis: lumbar radiculopathy   Post procedure Diagnosis: Same  Procedure performed: left L5 transforaminal epidural steroid injection, CPT code 28599  Anesthesia: none  Complications: none immediately  Operators: Armand Ferguson MD; Roman Ortega MD    Indications:   Ayah Garcia is a 70-year-old female was sent for a lumbar transforaminal epidural steroid injection      Options/alternatives, benefits and risks were discussed with the patient including bleeding, infection, tissue trauma, numbness, weakness, paralysis, spinal cord injury, radiation exposure, headache and reaction to medications. Questions were answered to her satisfaction and she agrees to proceed. Voluntary informed consent was obtained and signed.     Vitals were reviewed: Yes  Allergies were reviewed:  Yes   Medications were reviewed:  Yes   Pre-procedure pain score: 3/10    Procedure:  After getting informed consent, patient was brought into the procedure suite and was placed in a prone position on the procedure table.   A Pause for the Cause was performed.  Patient was prepped and draped in sterile fashion.     After identifying the left L5 neuroforamen, the C-arm was rotated to a left lateral oblique angle.  A total of 2 ml of Lidocaine 1% was used to anesthetize the skin and the needle track at a skin entry site coaxial with the fluoroscopy beam, and overriding the superior aspect of the neuroforamen.  A 22 gauge 5 inch spinal needle was advanced under intermittent fluoroscopy until it entered the foramen superiorly.    The position was then inspected from anteroposterior and lateral views, and the needle adjusted appropriately.  A total of 1.5 ml of Omnipaque-180 was injected, confirming appropriate position, with spread into the nerve root sheath and the epidural space, with no intravascular uptake. Visualization of active injection under live fluoroscopy further confirmed the above appropriate contrast  spread.    1ml of preservative free 1% lidocaine with 10mg of dexamethasone was injected.  The needle was flushed with lidocaine and removed.    During the procedure, the patient DID NOT experience paresthesias corresponding to the nerve root near final needle placement.    Hemostasis was achieved, the area was cleaned, and bandaids were placed when appropriate.    The patient tolerated the procedure well, and was taken to the recovery room. Images were saved to PACS.    Post-procedure pain score: 5/10  Follow-up includes:   -f/u with referring provider    Armand Ferguson MD  Pain Fellow, [facility]    Physician Attestation   I, Roman Ortega MD, was present for all key and critical portions of the procedure, and I was immediately available during the remainder of the procedure.  Any changes to the documentation have been made above.    Roman Ortega MD  Interventional Pain Medicine  [facility]

## 2025-05-28 NOTE — NURSING NOTE
Discharge Information    IV Discontiued Time:  NA    Amount of Fluid Infused:  NA    Discharge Criteria = When patient returns to baseline or as per MD order    Consciousness:  Pt is fully awake    Circulation:  BP +/- 20% of pre-procedure level    Respiration:  Patient is able to breathe deeply    O2 Sat:  Patient is able to maintain O2 Sat >92% on room air    Activity:  Moves 4 extremities on command    Ambulation:  Patient is able to stand and walk or stand and pivot into wheelchair    Dressing:  Clean/dry or No Dressing    Notes:   Discharge instructions and AVS given to patient    Patient meets criteria for discharge?  YES    Admitted to PCU?  No    Responsible adult present to accompany patient home?  Yes    Signature/Title:    Kiya Nolasco RN  RN Care Coordinator  [facility] [place]

## 2025-05-28 NOTE — NURSING NOTE
Pre-procedure Intake  If YES to any questions or NO to having a   Please complete laminated checklist and leave on the computer keyboard for Provider, verbally inform provider if able.    For SCS Trial, RFA's or any sedation procedure:  Have you been fasting? NA  If yes, for how long?     Are you taking any any blood thinners such as Coumadin, Warfarin, Jantoven, Pradaxa Xarelto, Eliquis, Edoxaban, Enoxaparin, Lovenox, Heparin, Arixtra, Fondaparinux, or Fragmin? OR Antiplatelet medication such as Plavix, Brilinta, or Effient?   No   If yes, when did you take your last dose?     Do you take aspirin?  No  If cervical procedure, have you held aspirin for 6 days?   NA    Is the Pt taking any GLP-1 Antagonist (hold needed for sedation patients only)  (semaglutide (Ozempic, Wegovy), dulaglutide (Trulicity), exenatide ER (Bydureon), tirzepatide (Mounjaro), Liraglutide (Saxenda, Victoza), semaglutide (Rybelsus)     NA  If yes, when did you take your last dose?     Do you have any allergies to contrast dye, iodine, steroid and/or numbing medications?  NO    Are you currently taking antibiotics or have an active infection?  NO    Have you had a fever/elevated temperature within the past week? NO    Are you currently taking oral steroids? NO    Do you have a ? Yes    Are you pregnant or breastfeeding?  NO    Have you received any vaccinations in the last week? NO    Notify provider and RNs if systolic BP >170, diastolic BP >100, P >100 or O2 sats < 90%     Adali Muro MA  [facility] [place]

## 2025-05-29 ENCOUNTER — DOCUMENTATION ONLY (OUTPATIENT)
Dept: NEUROSURGERY | Facility: CLINIC | Age: 70
End: 2025-05-29
Payer: MEDICARE

## 2025-05-29 NOTE — PROGRESS NOTES
Neuroscience Clinic Task Note    TASK    Home Health Certification  Date received (m/d/y) 5/29/2025   Action needed In provider's [place] bin: 05/29/25   Megan's signature:  Faxed to [phone]:  Confirm scan in HIM:      Date faxed (m/d/y)        FOLLOW-UP      ADDITIONAL COMMENTS      Nguyen Robles

## 2025-06-03 ENCOUNTER — THERAPY VISIT (OUTPATIENT)
Dept: PHYSICAL THERAPY | Facility: REHABILITATION | Age: 70
End: 2025-06-03
Attending: FAMILY MEDICINE
Payer: MEDICARE

## 2025-06-03 DIAGNOSIS — M79.18 PAIN IN LEFT BUTTOCK: ICD-10-CM

## 2025-06-03 DIAGNOSIS — M79.662 PAIN OF LEFT LOWER LEG: ICD-10-CM

## 2025-06-03 PROCEDURE — 97161 PT EVAL LOW COMPLEX 20 MIN: CPT | Mod: GP | Performed by: PHYSICAL THERAPIST

## 2025-06-03 PROCEDURE — 97110 THERAPEUTIC EXERCISES: CPT | Mod: GP | Performed by: PHYSICAL THERAPIST

## 2025-06-03 ASSESSMENT — ACTIVITIES OF DAILY LIVING (ADL)
PUTTING ON SOCKS AND SHOES: SLIGHT DIFFICULTY
SPORTS_SCORE(%): 0
HOS_ADL_ITEM_SCORE_TOTAL: 31
HOW_WOULD_YOU_RATE_YOUR_CURRENT_LEVEL_OF_FUNCTION_DURING_YOUR_USUAL_ACTIVITIES_OF_DAILY_LIVING_FROM_0_TO_100_WITH_100_BEING_YOUR_LEVEL_OF_FUNCTION_PRIOR_TO_YOUR_HIP_PROBLEM_AND_0_BEING_THE_INABILITY_TO_PERFORM_ANY_OF_YOUR_USUAL_DAILY_ACTIVITIES?: 50
WALKING_INITIALLY: MODERATE DIFFICULTY
ADL_HIGHEST_POTENTIAL_SCORE: 68
GETTING_INTO_AND_OUT_OF_AN_AVERAGE_CAR: SLIGHT DIFFICULTY
TWISTING/PIVOTING ON INVOLVED LEG: UNABLE TO DO
STEPPING UP AND DOWN CURBS: SLIGHT DIFFICULTY
STEPPING_UP_AND_DOWN_CURBS: SLIGHT DIFFICULTY
JUMPING: UNABLE TO DO
HEAVY_WORK: EXTREME DIFFICULTY
SPORTS_TOTAL_ITEM_SCORE: 0
GOING_UP_1_FLIGHT_OF_STAIRS: SLIGHT DIFFICULTY
SPORTS_COUNT: 9
STANDING FOR 15 MINUTES: MODERATE DIFFICULTY
STANDING_FOR_15_MINUTES: MODERATE DIFFICULTY
WALKING_15_MINUTES_OR_GREATER: SLIGHT DIFFICULTY
HEAVY_WORK: EXTREME DIFFICULTY
GOING UP 1 FLIGHT OF STAIRS: SLIGHT DIFFICULTY
HOW_WOULD_YOU_RATE_YOUR_CURRENT_LEVEL_OF_FUNCTION?: ABNORMAL
ABILITY_TO_PERFORM_ACTIVITY_WITH_YOUR_NORMAL_TECHNIQUE: MODERATE DIFFICULTY
HOS_ADL_HIGHEST_POTENTIAL_SCORE: 56
ADL_SCORE(%): 0
SWINGING_OBJECTS_LIKE_A_GOLF_CLUB: UNABLE TO DO
SITTING FOR 15 MINUTES: SLIGHT DIFFICULTY
TWISTING/PIVOTING_ON_INVOLVED_LEG: UNABLE TO DO
DEEP SQUATTING: EXTREME DIFFICULTY
ABILITY_TO_PARTICIPATE_IN_YOUR_DESIRED_SPORT_AS_LONG_AS_YOU_WOULD_LIKE: MODERATE DIFFICULTY
LOW_IMPACT_ACTIVITIES_LIKE_FAST_WALKING: MODERATE DIFFICULTY
RECREATIONAL_ACTIVITIES: UNABLE TO DO
PLEASE_INDICATE_YOR_PRIMARY_REASON_FOR_REFERRAL_TO_THERAPY:: HIP
WALKING_APPROXIMATELY_10_MINUTES: SLIGHT DIFFICULTY
WALKING_FOR_APPROXIMATELY_10_MINUTES: SLIGHT DIFFICULTY
LIGHT_TO_MODERATE_WORK: SLIGHT DIFFICULTY
PUTTING_ON_SOCKS_AND_SHOES: SLIGHT DIFFICULTY
GOING DOWN 1 FLIGHT OF STAIRS: NO DIFFICULTY AT ALL
RECREATIONAL ACTIVITIES: UNABLE TO DO
ROLLING_OVER_IN_BED: SLIGHT DIFFICULTY
WALKING_INITIALLY: MODERATE DIFFICULTY
ROLLING OVER IN BED: SLIGHT DIFFICULTY
GOING_DOWN_1_FLIGHT_OF_STAIRS: NO DIFFICULTY AT ALL
WALKING_15_MINUTES_OR_GREATER: SLIGHT DIFFICULTY
ADL_TOTAL_ITEM_SCORE: 0
SITTING_FOR_15_MINUTES: SLIGHT DIFFICULTY
ADL_COUNT: 17
LIGHT_TO_MODERATE_WORK: SLIGHT DIFFICULTY
GETTING INTO AND OUT OF AN AVERAGE CAR: SLIGHT DIFFICULTY
DEEP_SQUATTING: EXTREME DIFFICULTY
SPORTS_HIGHEST_POTENTIAL_SCORE: 36
HOW_WOULD_YOU_RATE_YOUR_CURRENT_LEVEL_OF_FUNCTION_DURING_YOUR_USUAL_ACTIVITIES_OF_DAILY_LIVING_FROM_0_TO_100_WITH_100_BEING_YOUR_LEVEL_OF_FUNCTION_PRIOR_TO_YOUR_HIP_PROBLEM_AND_0_BEING_THE_INABILITY_TO_PERFORM_ANY_OF_YOUR_USUAL_DAILY_ACTIVITIES?: 50
HOS_ADL_SCORE(%): 55.36

## 2025-06-03 NOTE — PROGRESS NOTES
"PHYSICAL THERAPY EVALUATION  Type of Visit: Evaluation       Subjective     Pt states that prior to their injection they had to spend 3 weeks in bed unable to move due to pain. Pt states that after their injection they have had no pain but wake up with stiffness in their L low back and L cheak. Right now they are walking about 6,000 steps a day and have a goal of walking 10,000 steps per day. She states that she has throbbing in the same area at night.     Pt states that the discomfort is not dependent on activity. Laying down helps to relieve the pain and they have iced once or twice. They were taking ibuprofen which was beneficial but she is stopping due to stomach issues. She still has some muscle relaxants and pain pills but wants to save those for upcoming trips.     2 falls due to looking at phone in [place] tripping over curb but was not injured in either.     When she first starting feeling the pain she was baking cookies and then moved wrong. She describes her pain as electrical shocks that went down the L cheeck, L leg, and on the top of the L foot. \"Scream into your pillow pain.\" Currently, pt's pain is triggered by turning (pivoting on her foot), sitting and standing for longer than 15 min (has cushion she sits on at home allowing her to sit longer). Walking helps but states that she is so tried at the end of the day due to increased walking. Pt states that she is mostly concerned about the weakness in her leg. She had home PT for her leg and has been doing those exercises at home which consist mainly of piriformis centered stretching. She has been walking down the stairs but has not walked up yet.     Pt goals are to: get stronger, be able to golf and play bocce ball, walking 10,000 steps.         Presenting condition or subjective complaint: Back recovery, sciatica  Date of onset: 04/17/25    Relevant medical history:     Dates & types of surgery:      Prior diagnostic imaging/testing results: MRI   "   Prior therapy history for the same diagnosis, illness or injury: Yes At home PT    Prior Level of Function  Transfers: Independent  Ambulation: Independent  ADL:   IADL:     Living Environment  Social support: With a significant other or spouse   Type of home: Apartment/condo   Stairs to enter the home: No       Ramp: No   Stairs inside the home: No       Help at home:    Equipment owned: Straight Cane; Raised toilet seat; Bath bench     Employment: Not Applicable    Hobbies/Interests: Golf, bocce, walking    Patient goals for therapy: Regain strength and balance    Pain assessment: Pain denied     Objective   LUMBAR SPINE EVALUATION  PAIN:   INTEGUMENTARY (edema, incisions):   POSTURE: Sitting Posture: Rounded shoulders, Forward head  GAIT: Decreased gait speed, decreased stance time on L leg, utilized walking stick upon walking into the clinic but did not use for testing. No signs of imbalance or gait deviations this date.  Weightbearing Status:   Assistive Device(s):   Gait Deviations:   BALANCE/PROPRIOCEPTION:   WEIGHTBEARING ALIGNMENT:   NON-WEIGHTBEARING ALIGNMENT:    ROM: Trunk ROM: Flex - limited 25% by tightness in posterior LE's; Ext - WNL  PELVIC/SI SCREEN:   STRENGTH: Hip abduction in sitting: decreased bilaterally    MYOTOMES:    Left Right   T12-L3 (Hip Flexion) 4- 5-   L2-4 (Quads)  5 5   L4 (Ankle DF) 4- 4-   L5 (Great Toe Ext) 5 5   S1 (Toe Raise) 5 5     DTR S:   CORD SIGNS:   DERMATOMES:   NEURAL TENSION:   FLEXIBILITY:   LUMBAR/HIP Special Tests:    PELVIS/SI SPECIAL TESTS:   FUNCTIONAL TESTS: 10MWT: 6.18 sec (0.97 m/s); 30 sec STS: 11 reps  PALPATION:   SPINAL SEGMENTAL CONCLUSIONS:       Assessment & Plan   CLINICAL IMPRESSIONS  Medical Diagnosis: M79.662 (ICD-10-CM) - Pain of left lower leg  M79.18 (ICD-10-CM) - Pain in left buttock    Treatment Diagnosis: Decreased activity tolerance, decreased LE strength   Impression/Assessment: Patient is a 70 year old female with L low back and buttock  "pain complaints. Pt states that she was baking cookies one day and \"turned wrong then had a feeling of electric shock go down my leg and on to the top of my foot.\" Following the event she stayed in bed for 3 weeks due to the intense pain. She received an injection on May 28th 2025 and has not had the intense shooting pain since. Her current complaints include stiffness in her \"left cheek and in my left low back\" in the morning and achiness in the afternoon in the same location. She states that laying down helps her pain as well as walking helps. Per pt report, she is still having difficulty with walking and sitting longer than 15 min. Her biggest complaint is the weakness in her legs. The following significant findings have been identified: Pain, Decreased strength, and Decreased activity tolerance. These impairments interfere with their ability to perform recreational activities and community mobility as compared to previous level of function. PT objective findings include decreased L LE strength, decreased gait speed, and decreased activity tolerance. Pt would benefit from skilled 1:1 PT services in order to increase function.     Clinical Decision Making (Complexity):  Clinical Presentation: Stable/Uncomplicated  Clinical Presentation Rationale: based on medical and personal factors listed in PT evaluation  Clinical Decision Making (Complexity): Low complexity    PLAN OF CARE  Treatment Interventions:  Interventions: Manual Therapy, Neuromuscular Re-education, Therapeutic Activity, Therapeutic Exercise    Long Term Goals     PT Goal 1  Goal Identifier: HEP  Goal Description: Pt will be 50% complient with HEP in order to progress towards personal and functional goals.  Target Date: 09/01/25  PT Goal 2  Goal Identifier: Gait speed  Goal Description: Pt will increase their gait speed to at least 1.132 m/s in order to be within age and gender norms.  Rationale: to maximize safety and independence with " transportation;to maximize safety and independence within the community;to maximize safety and independence with performance of ADLs and functional tasks  Target Date: 09/01/25  PT Goal 3  Goal Identifier: Walking tolerance  Goal Description: Pt will describe being able to ambulate for at least 30 min without increase c/o sxs in order to return to PLOF.  Target Date: 09/01/25  PT Goal 4  Goal Identifier: Golf  Goal Description: Pt will describe being able to play 9 holes of golf without increase c/o sxs in order to return to PLOF.  Target Date: 09/01/25      Frequency of Treatment: Biweekly  Duration of Treatment: 12 weeks    Recommended Referrals to Other Professionals: None  Education Assessment:   Learner/Method: Patient  Education Comments: Educated on HEP and cues given for proper technique.    Risks and benefits of evaluation/treatment have been explained.   Patient/Family/caregiver agrees with Plan of Care.     Evaluation Time:     PT Eval, Low Complexity Minutes (12821): 30     Signing Clinician: Álvaro Espinosa PT        [facility]                                                                                   OUTPATIENT PHYSICAL THERAPY      PLAN OF TREATMENT FOR OUTPATIENT REHABILITATION   Patient's Last Name, First Name, Ayah Evans YOB: 1955   Provider's Name   [facility]   Medical Record No.  [id number]     Onset Date: 04/17/25  Start of Care Date: 06/03/25     Medical Diagnosis:  M79.662 (ICD-10-CM) - Pain of left lower leg  M79.18 (ICD-10-CM) - Pain in left buttock      PT Treatment Diagnosis:  Decreased activity tolerance, decreased LE strength Plan of Treatment  Frequency/Duration: Biweekly/ 12 weeks    Certification date from 06/03/25 to 09/01/25         See note for plan of treatment details and functional goals     Álvaro Espinosa PT                         I CERTIFY THE NEED FOR THESE SERVICES  FURNISHED UNDER        THIS PLAN OF TREATMENT AND WHILE UNDER MY CARE     (Physician attestation of this document indicates review and certification of the therapy plan).              Referring Provider:  Jorge Connors    Initial Assessment  See Epic Evaluation- Start of Care Date: 06/03/25

## 2025-06-05 DIAGNOSIS — Z53.9 DIAGNOSIS NOT YET DEFINED: Primary | ICD-10-CM

## 2025-06-05 PROCEDURE — G0180 MD CERTIFICATION HHA PATIENT: HCPCS | Performed by: FAMILY MEDICINE

## 2025-06-19 ENCOUNTER — TELEPHONE (OUTPATIENT)
Dept: FAMILY MEDICINE | Facility: CLINIC | Age: 70
End: 2025-06-19
Payer: MEDICARE

## 2025-06-19 NOTE — TELEPHONE ENCOUNTER
Forms/Letter Request    Type of form/letter: Home Health Certification and Plan of Care Certification date of 04/28/2025 to 06/26/2025    Do we have the form/letter: Yes: faxed in on 06/18/2025    Who is the form from? Home care    Where did/will the form come from? form was faxed in    When is form/letter needed by: When Completed    How would you like the form/letter returned: Fax : [phone]